# Patient Record
Sex: FEMALE | Race: WHITE | Employment: FULL TIME | URBAN - METROPOLITAN AREA
[De-identification: names, ages, dates, MRNs, and addresses within clinical notes are randomized per-mention and may not be internally consistent; named-entity substitution may affect disease eponyms.]

---

## 2019-05-17 ENCOUNTER — HOSPITAL ENCOUNTER (OUTPATIENT)
Age: 24
Discharge: HOME | End: 2019-05-19
Payer: COMMERCIAL

## 2019-05-17 ENCOUNTER — OFFICE VISIT (OUTPATIENT)
Dept: FAMILY MEDICINE CLINIC | Age: 24
End: 2019-05-17
Payer: COMMERCIAL

## 2019-05-17 VITALS
DIASTOLIC BLOOD PRESSURE: 72 MMHG | WEIGHT: 209.2 LBS | OXYGEN SATURATION: 97 % | BODY MASS INDEX: 32.83 KG/M2 | SYSTOLIC BLOOD PRESSURE: 120 MMHG | HEART RATE: 87 BPM | TEMPERATURE: 97.5 F | HEIGHT: 67 IN

## 2019-05-17 DIAGNOSIS — R31.9 URINARY TRACT INFECTION WITH HEMATURIA, SITE UNSPECIFIED: ICD-10-CM

## 2019-05-17 DIAGNOSIS — R30.0 DYSURIA: Primary | ICD-10-CM

## 2019-05-17 DIAGNOSIS — N39.0 URINARY TRACT INFECTION WITH HEMATURIA, SITE UNSPECIFIED: ICD-10-CM

## 2019-05-17 DIAGNOSIS — R30.0 DYSURIA: ICD-10-CM

## 2019-05-17 LAB
BILIRUBIN, POC: ABNORMAL
BLOOD URINE, POC: ABNORMAL
CLARITY, POC: ABNORMAL
COLOR, POC: ABNORMAL
CONTROL: NORMAL
GLUCOSE URINE, POC: ABNORMAL
KETONES, POC: ABNORMAL
LEUKOCYTE EST, POC: ABNORMAL
NITRITE, POC: ABNORMAL
PH, POC: 7.5
PREGNANCY TEST URINE, POC: NEGATIVE
PROTEIN, POC: ABNORMAL
SPECIFIC GRAVITY, POC: >=1.03
UROBILINOGEN, POC: 0.2

## 2019-05-17 PROCEDURE — 87186 SC STD MICRODIL/AGAR DIL: CPT

## 2019-05-17 PROCEDURE — 81002 URINALYSIS NONAUTO W/O SCOPE: CPT | Performed by: PHYSICIAN ASSISTANT

## 2019-05-17 PROCEDURE — 87088 URINE BACTERIA CULTURE: CPT

## 2019-05-17 PROCEDURE — 81025 URINE PREGNANCY TEST: CPT | Performed by: PHYSICIAN ASSISTANT

## 2019-05-17 PROCEDURE — 99214 OFFICE O/P EST MOD 30 MIN: CPT | Performed by: PHYSICIAN ASSISTANT

## 2019-05-17 PROCEDURE — 87077 CULTURE AEROBIC IDENTIFY: CPT

## 2019-05-17 RX ORDER — CEPHALEXIN 500 MG/1
500 CAPSULE ORAL 2 TIMES DAILY
Qty: 14 CAPSULE | Refills: 0 | Status: SHIPPED | OUTPATIENT
Start: 2019-05-17 | End: 2019-05-24

## 2019-05-17 NOTE — PROGRESS NOTES
19  Lexis Floyd : 1995 Sex: female  Age 21 y.o. Subjective:  Chief Complaint   Patient presents with    Urinary Tract Infection     onset wednesday, burning, urgency, frequency, afebrile         HPI:   Lexis Floyd , 21 y.o. female presents to express care for evaluation of possible urinary tract infection. The patient started on Wednesday with some urinary frequency urgency and burning with urination. The patient is not having back pain or flank pain. She has not noted any fevers. The patient is not having any abdominal pain. No vaginal bleeding or vaginal discharge. Patient denies chance of pregnancy. Denies chances STI. The patient is not currently on any antibiotics. She states that she has never had a urinary tract infection. ROS:   Unless otherwise stated in this report the patient's positive and negative responses for review of systems for constitutional, eyes, ENT, cardiovascular, respiratory, gastrointestinal, neurological, , musculoskeletal, and integument systems and related systems to the presenting problem are either stated in the history of present illness or were not pertinent or were negative for the symptoms and/or complaints related to the presenting medical problem. Positives and pertinent negatives as per HPI. All others reviewed and are negative. PMH:   History reviewed. No pertinent past medical history. No past surgical history on file. Medications:     Current Outpatient Medications:     cephALEXin (KEFLEX) 500 MG capsule, Take 1 capsule by mouth 2 times daily for 7 days, Disp: 14 capsule, Rfl: 0    Allergies: Allergies   Allergen Reactions    Tuberculin        Social History:     Personal Habits: Never smoked cigarettes. Does not use alcohol. . (Exercise Type/Frequency  Current)  Reviewed, no changes.   Patient is   Lives at home  Works as RN    Physical Exam:     Vitals:    19 1204   BP: 120/72   Site: Right Upper Arm Position: Sitting   Pulse: 87   Temp: 97.5 °F (36.4 °C)   TempSrc: Temporal   SpO2: 97%   Weight: 209 lb 3.2 oz (94.9 kg)   Height: 5' 7\" (1.702 m)       Exam:  Physical Exam  Nurses note and vital signs reviewed and patient is not hypoxic. General: The patient appears well and in no apparent distress. Patient is resting comfortably on cart. Skin: Warm, dry, no pallor noted. There is no rash noted. Head: Normocephalic, atraumatic. Eye: Normal conjunctiva  Ears, Nose, Mouth, and Throat: Oral mucosa is moist  Cardiovascular: Regular Rate and Rhythm  Respiratory: Patient is in no distress, no accessory muscle use, lungs are clear to auscultation, no wheezing, crackles or rhonchi  Back: Non-tender, no CVA tenderness bilaterally to percussion. GI: Normal bowel sounds, no tenderness to palpation, no masses appreciated. No rebound, guarding, or rigidity noted. Musculoskeletal: The patient has no evidence of calf tenderness, no pitting edema, symmetrical pulses noted bilaterally  Neurological: A&O x4, normal speech      Testing:     Results for orders placed or performed in visit on 05/17/19   POCT Urinalysis no Micro   Result Value Ref Range    Color, UA claudia     Clarity, UA cloudy     Glucose, UA POC neg     Bilirubin, UA neg     Ketones, UA neg     Spec Grav, UA >=1.030     Blood, UA POC moderate     pH, UA 7.5     Protein, UA POC trace     Urobilinogen, UA 0.2     Leukocytes, UA moderate     Nitrite, UA neg    POCT urine pregnancy   Result Value Ref Range    Preg Test, Ur negative     Control             Medical Decision Making:     The patient is noted to be afebrile and nontoxic appearing. The patient had UA that did show evidence of a UTI. We did obtain a urine culture, which was sent to the lab for further evaluation. Pregnancy negative. She will be started on cephalexin. The patient is to follow up with PCP for results and we will make any necessary adjustments to the patient's medication regimen. The patient will go directly to ED if notes nausea, vomiting, back pain, fever, chills or worsening symptoms. The patient has no other concerns at this time. Clinical Impression:   Debra James was seen today for urinary tract infection. Diagnoses and all orders for this visit:    Dysuria  -     POCT Urinalysis no Micro  -     POCT urine pregnancy  -     Urine Culture; Future    Urinary tract infection with hematuria, site unspecified    Other orders  -     cephALEXin (KEFLEX) 500 MG capsule; Take 1 capsule by mouth 2 times daily for 7 days        The patient is to call for any concerns or return if any of the signs or symptoms worsen. The patient is to follow-up with PCP in the next 2-3 days for repeat evaluation repeat assessment or go directly to the emergency department.      SIGNATURE: Shruthi Macias III, PA-C

## 2019-05-20 LAB
ORGANISM: ABNORMAL
URINE CULTURE, ROUTINE: ABNORMAL
URINE CULTURE, ROUTINE: ABNORMAL

## 2019-11-25 RX ORDER — CYCLOBENZAPRINE HCL 10 MG
10 TABLET ORAL NIGHTLY PRN
COMMUNITY
End: 2020-06-18 | Stop reason: ALTCHOICE

## 2020-04-20 ENCOUNTER — TELEPHONE (OUTPATIENT)
Dept: PRIMARY CARE CLINIC | Age: 25
End: 2020-04-20

## 2020-05-01 ENCOUNTER — OFFICE VISIT (OUTPATIENT)
Dept: PRIMARY CARE CLINIC | Age: 25
End: 2020-05-01
Payer: COMMERCIAL

## 2020-05-01 ENCOUNTER — HOSPITAL ENCOUNTER (OUTPATIENT)
Age: 25
Discharge: HOME | End: 2020-05-03
Payer: COMMERCIAL

## 2020-05-01 VITALS
WEIGHT: 209 LBS | OXYGEN SATURATION: 98 % | HEART RATE: 88 BPM | HEIGHT: 67 IN | BODY MASS INDEX: 32.8 KG/M2 | DIASTOLIC BLOOD PRESSURE: 74 MMHG | TEMPERATURE: 97.5 F | SYSTOLIC BLOOD PRESSURE: 116 MMHG

## 2020-05-01 LAB — SARS-COV-2, NAAT: NOT DETECTED

## 2020-05-01 PROCEDURE — G8427 DOCREV CUR MEDS BY ELIG CLIN: HCPCS | Performed by: FAMILY MEDICINE

## 2020-05-01 PROCEDURE — U0002 COVID-19 LAB TEST NON-CDC: HCPCS

## 2020-05-01 PROCEDURE — 99214 OFFICE O/P EST MOD 30 MIN: CPT | Performed by: FAMILY MEDICINE

## 2020-05-01 PROCEDURE — 1036F TOBACCO NON-USER: CPT | Performed by: FAMILY MEDICINE

## 2020-05-01 PROCEDURE — G8417 CALC BMI ABV UP PARAM F/U: HCPCS | Performed by: FAMILY MEDICINE

## 2020-05-04 ENCOUNTER — HOSPITAL ENCOUNTER (OUTPATIENT)
Age: 25
Discharge: HOME | End: 2020-05-06
Payer: COMMERCIAL

## 2020-05-04 ENCOUNTER — NURSE ONLY (OUTPATIENT)
Dept: PRIMARY CARE CLINIC | Age: 25
End: 2020-05-04

## 2020-05-04 LAB — SARS-COV-2, NAAT: NOT DETECTED

## 2020-05-04 PROCEDURE — U0002 COVID-19 LAB TEST NON-CDC: HCPCS

## 2020-06-18 ENCOUNTER — OFFICE VISIT (OUTPATIENT)
Dept: FAMILY MEDICINE CLINIC | Age: 25
End: 2020-06-18
Payer: COMMERCIAL

## 2020-06-18 VITALS
DIASTOLIC BLOOD PRESSURE: 82 MMHG | SYSTOLIC BLOOD PRESSURE: 132 MMHG | BODY MASS INDEX: 33.9 KG/M2 | RESPIRATION RATE: 18 BRPM | TEMPERATURE: 97.3 F | HEART RATE: 77 BPM | WEIGHT: 216 LBS | HEIGHT: 67 IN | OXYGEN SATURATION: 99 %

## 2020-06-18 PROCEDURE — 1036F TOBACCO NON-USER: CPT | Performed by: PHYSICIAN ASSISTANT

## 2020-06-18 PROCEDURE — G8427 DOCREV CUR MEDS BY ELIG CLIN: HCPCS | Performed by: PHYSICIAN ASSISTANT

## 2020-06-18 PROCEDURE — 99214 OFFICE O/P EST MOD 30 MIN: CPT | Performed by: PHYSICIAN ASSISTANT

## 2020-06-18 PROCEDURE — G8417 CALC BMI ABV UP PARAM F/U: HCPCS | Performed by: PHYSICIAN ASSISTANT

## 2020-06-18 RX ORDER — TIZANIDINE 4 MG/1
4 TABLET ORAL NIGHTLY PRN
Qty: 20 TABLET | Refills: 0 | Status: SHIPPED
Start: 2020-06-18 | End: 2021-07-19

## 2020-06-18 SDOH — HEALTH STABILITY: MENTAL HEALTH: HOW OFTEN DO YOU HAVE A DRINK CONTAINING ALCOHOL?: 2-4 TIMES A MONTH

## 2020-06-18 NOTE — PROGRESS NOTES
20  Berta Reeves : 1995 Sex: female  Age 25 y.o. Subjective:  Chief Complaint   Patient presents with    Neck Pain     pt states neck pain for past six months          HPI:   Berta Reeves , 25 y.o. female presents to express care for evaluation of neck pain and upper back pain. The patient has had this pain ongoing for the last several months. She was worried that she was developing some kyphosis due to her posture, weight, and her occupation. 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 The patient has not had any traumatic injury. No numbness or tingling. The patient's not having any headaches. No visual disturbances. The patient has not noted any fevers or photophobia. ROS:   Unless otherwise stated in this report the patient's positive and negative responses for review of systems for constitutional, eyes, ENT, cardiovascular, respiratory, gastrointestinal, neurological, , musculoskeletal, and integument systems and related systems to the presenting problem are either stated in the history of present illness or were not pertinent or were negative for the symptoms and/or complaints related to the presenting medical problem. Positives and pertinent negatives as per HPI. All others reviewed and are negative. PMH:   History reviewed. No pertinent past medical history. History reviewed. No pertinent surgical history. Family History   Problem Relation Age of Onset    Obesity Mother     Other Mother         GERD    Depression Father        Medications:     Current Outpatient Medications:     tiZANidine (ZANAFLEX) 4 MG tablet, Take 1 tablet by mouth nightly as needed (neck pain/spasm), Disp: 20 tablet, Rfl: 0    Allergies: Allergies   Allergen Reactions    Tuberculin        Social History:     Social History     Tobacco Use    Smoking status: Never Smoker    Smokeless tobacco: Never Used   Substance Use Topics    Alcohol use:  Yes

## 2021-07-19 ENCOUNTER — OFFICE VISIT (OUTPATIENT)
Dept: FAMILY MEDICINE CLINIC | Age: 26
End: 2021-07-19
Payer: COMMERCIAL

## 2021-07-19 VITALS
SYSTOLIC BLOOD PRESSURE: 118 MMHG | RESPIRATION RATE: 16 BRPM | BODY MASS INDEX: 34.4 KG/M2 | HEART RATE: 91 BPM | TEMPERATURE: 96.9 F | WEIGHT: 227 LBS | DIASTOLIC BLOOD PRESSURE: 78 MMHG | OXYGEN SATURATION: 98 % | HEIGHT: 68 IN

## 2021-07-19 DIAGNOSIS — Z32.01 POSITIVE URINE PREGNANCY TEST: ICD-10-CM

## 2021-07-19 DIAGNOSIS — Z11.59 NEED FOR HEPATITIS C SCREENING TEST: ICD-10-CM

## 2021-07-19 DIAGNOSIS — Z00.00 ENCOUNTER FOR MEDICAL EXAMINATION TO ESTABLISH CARE: Primary | ICD-10-CM

## 2021-07-19 DIAGNOSIS — Z11.4 ENCOUNTER FOR SCREENING FOR HIV: ICD-10-CM

## 2021-07-19 LAB
ANION GAP SERPL CALCULATED.3IONS-SCNC: 16 MMOL/L (ref 7–16)
BASOPHILS ABSOLUTE: 0.03 E9/L (ref 0–0.2)
BASOPHILS RELATIVE PERCENT: 0.3 % (ref 0–2)
BUN BLDV-MCNC: 10 MG/DL (ref 6–20)
CALCIUM SERPL-MCNC: 9.3 MG/DL (ref 8.6–10.2)
CHLORIDE BLD-SCNC: 103 MMOL/L (ref 98–107)
CO2: 19 MMOL/L (ref 22–29)
CONTROL: PRESENT
CREAT SERPL-MCNC: 0.6 MG/DL (ref 0.5–1)
EOSINOPHILS ABSOLUTE: 0.23 E9/L (ref 0.05–0.5)
EOSINOPHILS RELATIVE PERCENT: 2.2 % (ref 0–6)
GFR AFRICAN AMERICAN: >60
GFR NON-AFRICAN AMERICAN: >60 ML/MIN/1.73
GLUCOSE BLD-MCNC: 76 MG/DL (ref 74–99)
HCT VFR BLD CALC: 40.7 % (ref 34–48)
HEMOGLOBIN: 12.4 G/DL (ref 11.5–15.5)
IMMATURE GRANULOCYTES #: 0.04 E9/L
IMMATURE GRANULOCYTES %: 0.4 % (ref 0–5)
LYMPHOCYTES ABSOLUTE: 2.36 E9/L (ref 1.5–4)
LYMPHOCYTES RELATIVE PERCENT: 22.9 % (ref 20–42)
MCH RBC QN AUTO: 27.4 PG (ref 26–35)
MCHC RBC AUTO-ENTMCNC: 30.5 % (ref 32–34.5)
MCV RBC AUTO: 89.8 FL (ref 80–99.9)
MONOCYTES ABSOLUTE: 1.05 E9/L (ref 0.1–0.95)
MONOCYTES RELATIVE PERCENT: 10.2 % (ref 2–12)
NEUTROPHILS ABSOLUTE: 6.6 E9/L (ref 1.8–7.3)
NEUTROPHILS RELATIVE PERCENT: 64 % (ref 43–80)
PDW BLD-RTO: 13.3 FL (ref 11.5–15)
PLATELET # BLD: 259 E9/L (ref 130–450)
PMV BLD AUTO: 10.7 FL (ref 7–12)
POTASSIUM SERPL-SCNC: 4.2 MMOL/L (ref 3.5–5)
PREGNANCY TEST URINE, POC: POSITIVE
RBC # BLD: 4.53 E12/L (ref 3.5–5.5)
SODIUM BLD-SCNC: 138 MMOL/L (ref 132–146)
TSH SERPL DL<=0.05 MIU/L-ACNC: 12.25 UIU/ML (ref 0.27–4.2)
WBC # BLD: 10.3 E9/L (ref 4.5–11.5)

## 2021-07-19 PROCEDURE — 81025 URINE PREGNANCY TEST: CPT | Performed by: PHYSICIAN ASSISTANT

## 2021-07-19 PROCEDURE — 99204 OFFICE O/P NEW MOD 45 MIN: CPT | Performed by: PHYSICIAN ASSISTANT

## 2021-07-19 SDOH — ECONOMIC STABILITY: FOOD INSECURITY: WITHIN THE PAST 12 MONTHS, THE FOOD YOU BOUGHT JUST DIDN'T LAST AND YOU DIDN'T HAVE MONEY TO GET MORE.: PATIENT DECLINED

## 2021-07-19 SDOH — ECONOMIC STABILITY: FOOD INSECURITY: WITHIN THE PAST 12 MONTHS, YOU WORRIED THAT YOUR FOOD WOULD RUN OUT BEFORE YOU GOT MONEY TO BUY MORE.: PATIENT DECLINED

## 2021-07-19 ASSESSMENT — PATIENT HEALTH QUESTIONNAIRE - PHQ9
2. FEELING DOWN, DEPRESSED OR HOPELESS: 0
1. LITTLE INTEREST OR PLEASURE IN DOING THINGS: 0
SUM OF ALL RESPONSES TO PHQ9 QUESTIONS 1 & 2: 0
SUM OF ALL RESPONSES TO PHQ QUESTIONS 1-9: 0

## 2021-07-19 ASSESSMENT — SOCIAL DETERMINANTS OF HEALTH (SDOH): HOW HARD IS IT FOR YOU TO PAY FOR THE VERY BASICS LIKE FOOD, HOUSING, MEDICAL CARE, AND HEATING?: PATIENT DECLINED

## 2021-07-19 NOTE — PROGRESS NOTES
HPI:  The patient is a 22 y.o. female who presents today to establish care. She has no significant medical hx, and takes no daily meds. Patient would like to confirm pregnancy. Her last menses was Donita 3. She had 7 urine pregnancy tests that were positive. She has fatigue and breast tenderness. She had mild nausea that has resolved. Her first OB appointment is 08/03/2021 with Dr. Gayatri Anand. She is a nonsmoker. She is taking a prenatal vitamin. She has no acute complaints. History reviewed. No pertinent past medical history. History reviewed. No pertinent surgical history. Family History   Problem Relation Age of Onset    Obesity Mother     Other Mother         GERD    Diabetes Mother     Depression Father     No Known Problems Brother      Social History     Socioeconomic History    Marital status: Single     Spouse name: Not on file    Number of children: Not on file    Years of education: Not on file    Highest education level: Not on file   Occupational History    Not on file   Tobacco Use    Smoking status: Never Smoker    Smokeless tobacco: Never Used   Substance and Sexual Activity    Alcohol use: Not Currently    Drug use: Never    Sexual activity: Not on file   Other Topics Concern    Not on file   Social History Narrative    Not on file     Social Determinants of Health     Financial Resource Strain: Unknown    Difficulty of Paying Living Expenses: Patient refused   Food Insecurity: Unknown    Worried About Running Out of Food in the Last Year: Patient refused    [address] in the Last Year: Patient refused   Transportation Needs:     Lack of Transportation (Medical):      Lack of Transportation (Non-Medical):    Physical Activity:     Days of Exercise per Week:     Minutes of Exercise per Session:    Stress:     Feeling of Stress :    Social Connections:     Frequency of Communication with Friends and Family:     Frequency of Social Gatherings with Friends and Family:     Attends [employer] Services:     Active Member of Clubs or Organizations:     Attends Club or Organization Meetings:     Marital Status:    Intimate Partner Violence:     Fear of Current or Ex-Partner:     Emotionally Abused:     Physically Abused:     Sexually Abused: Allergies   Allergen Reactions    Tuberculin         Review of Systems:  Constitutional:  No fever, no fatigue, no chills, no headaches, no weight change  Dermatology:  No rash, no mole, no dry or sensitive skin  ENT:  No cough, no sore throat, no sinus pain, no runny nose, no ear pain  Cardiology:  No chest pain, no palpitations, no leg edema, no shortness of breath, no PND  Endocrinology:  No polydipsia, no polyuria, no cold intolerance, no heat intolerance, no polyphagia, no hair changes  Gastroenterology:  No dysphagia, no abdominal pain, no nausea, no vomiting, no constipation, no diarrhea, no heartburn  Female Reproductive:  No hot flashes, no abnormal vaginal discharge, no pain with menstruation, no pelvic pain  Musculoskeletal:  No joint pain, no leg cramps, no back pain, no muscle aches  Respiratory:  No shortness of breath, no orthopnea, no wheezing, no STARK, no hemoptysis  Urology:  No blood in the urine, no urinary frequency, no urinary incontinence, no urinary urgency, no nocturia, no dysuria  Neurology:  No numbness/tingling, no dizziness, no weakness  Psychology:  No depression, no sleep disturbances, no suicidal ideation, no anxiety    Vitals:    07/19/21 1608   BP: 118/78   Pulse: 91   Resp: 16   Temp: 96.9 °F (36.1 °C)   SpO2: 98%   Weight: 227 lb (103 kg)   Height: 5' 8\" (1.727 m)       Physical Exam  Constitutional:       General: She is not in acute distress. Appearance: She is well-developed. She is obese. HENT:      Head: Normocephalic and atraumatic. Right Ear: External ear normal.      Left Ear: External ear normal.      Nose: Nose normal.   Eyes:      General: No scleral icterus. Conjunctiva/sclera: Conjunctivae normal.      Pupils: Pupils are equal, round, and reactive to light. Neck:      Thyroid: No thyromegaly. Cardiovascular:      Rate and Rhythm: Normal rate and regular rhythm. Heart sounds: Normal heart sounds. No murmur heard. Pulmonary:      Effort: Pulmonary effort is normal. No accessory muscle usage or respiratory distress. Breath sounds: Normal breath sounds. No wheezing. Musculoskeletal:         General: Normal range of motion. Cervical back: Normal range of motion and neck supple. Right lower leg: No edema. Left lower leg: No edema. Skin:     General: Skin is warm and dry. Findings: No rash. Neurological:      Mental Status: She is alert and oriented to person, place, and time. Deep Tendon Reflexes: Reflexes are normal and symmetric. Psychiatric:         Speech: Speech normal.         Behavior: Behavior normal.         Assessment/Plan:      Caty Bucio was seen today for new patient. Diagnoses and all orders for this visit:    Encounter for medical examination to establish care    BMI 34.0-34.9,adult  -     TSH; Future    Positive urine pregnancy test  -     POCT urine pregnancy  -     HCG, QUANTITATIVE, PREGNANCY; Future  -     HIV Screen; Future  -     HEPATITIS C ANTIBODY; Future  -     CBC Auto Differential; Future  -     BASIC METABOLIC PANEL; Future  -     TSH; Future    Encounter for screening for HIV  -     HIV Screen; Future    Need for hepatitis C screening test  -     HEPATITIS C ANTIBODY; Future      As above. Call or go to ED immediately if symptoms worsen or persist.  Return in about 6 months (around 1/19/2022). , or sooner if necessary. Possible need for tdap after 27 weeks. Educational materials and/or home exercises printed for patient's review and were included in patient instructions on his/her After Visit Summary and given to patient at the end of visit.       Counseled regarding above diagnosis, including

## 2021-07-21 LAB — GONADOTROPIN, CHORIONIC (HCG) QUANT: ABNORMAL MIU/ML

## 2021-07-22 ENCOUNTER — TELEPHONE (OUTPATIENT)
Dept: FAMILY MEDICINE CLINIC | Age: 26
End: 2021-07-22

## 2021-07-22 LAB
HEPATITIS C ANTIBODY INTERPRETATION: NORMAL
HIV-1 AND HIV-2 ANTIBODIES: NORMAL

## 2021-07-22 NOTE — TELEPHONE ENCOUNTER
----- Message from Willow Oconnor sent at 7/22/2021 10:51 AM EDT -----  Subject: Message to Provider    QUESTIONS  Information for Provider? Patient received abnormal test results on   7.21.21, she has a follow up appt on Monday 7.26.21. Wants to know if she   should start taking a prescription prior to her appt.  ---------------------------------------------------------------------------  --------------  CALL BACK INFO  What is the best way for the office to contact you? OK to leave message on   voicemail  Preferred Call Back Phone Number? [phone]  ---------------------------------------------------------------------------  --------------  SCRIPT ANSWERS  Relationship to Patient?  Self

## 2021-07-23 ENCOUNTER — NURSE ONLY (OUTPATIENT)
Dept: FAMILY MEDICINE CLINIC | Age: 26
End: 2021-07-23

## 2021-07-23 DIAGNOSIS — O26.811 PREGNANCY RELATED FATIGUE IN FIRST TRIMESTER: ICD-10-CM

## 2021-07-23 DIAGNOSIS — O26.811 PREGNANCY RELATED FATIGUE IN FIRST TRIMESTER: Primary | ICD-10-CM

## 2021-07-23 NOTE — TELEPHONE ENCOUNTER
Explained to patient that it would be discussed at the office visit.  She also sent a my chart message

## 2021-07-24 LAB
T4 FREE: 1.01 NG/DL (ref 0.93–1.7)
TSH SERPL DL<=0.05 MIU/L-ACNC: 12.88 UIU/ML (ref 0.27–4.2)

## 2021-07-26 ENCOUNTER — OFFICE VISIT (OUTPATIENT)
Dept: FAMILY MEDICINE CLINIC | Age: 26
End: 2021-07-26
Payer: COMMERCIAL

## 2021-07-26 VITALS
SYSTOLIC BLOOD PRESSURE: 108 MMHG | DIASTOLIC BLOOD PRESSURE: 60 MMHG | BODY MASS INDEX: 35.82 KG/M2 | TEMPERATURE: 96.8 F | HEIGHT: 67 IN | RESPIRATION RATE: 18 BRPM | OXYGEN SATURATION: 99 % | WEIGHT: 228.2 LBS | HEART RATE: 85 BPM

## 2021-07-26 DIAGNOSIS — E03.9 HYPOTHYROIDISM, UNSPECIFIED TYPE: ICD-10-CM

## 2021-07-26 DIAGNOSIS — Z3A.08 8 WEEKS GESTATION OF PREGNANCY: ICD-10-CM

## 2021-07-26 DIAGNOSIS — E03.9 HYPOTHYROIDISM, UNSPECIFIED TYPE: Primary | ICD-10-CM

## 2021-07-26 PROCEDURE — 99214 OFFICE O/P EST MOD 30 MIN: CPT | Performed by: PHYSICIAN ASSISTANT

## 2021-07-26 RX ORDER — LEVOTHYROXINE SODIUM 0.1 MG/1
100 TABLET ORAL DAILY
Qty: 30 TABLET | Refills: 1
Start: 2021-07-26 | End: 2021-07-26 | Stop reason: SDUPTHER

## 2021-07-26 RX ORDER — LEVOTHYROXINE SODIUM 0.1 MG/1
100 TABLET ORAL DAILY
Qty: 30 TABLET | Refills: 3 | Status: SHIPPED
Start: 2021-07-26 | End: 2021-10-16 | Stop reason: SDUPTHER

## 2021-08-13 ENCOUNTER — TELEPHONE (OUTPATIENT)
Dept: FAMILY MEDICINE CLINIC | Age: 26
End: 2021-08-13

## 2021-08-13 NOTE — TELEPHONE ENCOUNTER
----- Message from Gloria Barrett sent at 8/13/2021 12:59 PM EDT -----  Subject: Appointment Request    Reason for Call: Routine Existing Condition Follow Up    QUESTIONS  Type of Appointment? Established Patient  Reason for appointment request? Available appointments did not meet   patient need  Additional Information for Provider? screen green / pt is trying to   reschedule her appointment from 8/27/21. Pt is trying to schedule for   medicine refill. SYNTHROID is the medication and to also schedule for lab   work. in person visit but pt is also open to e visit as well   ---------------------------------------------------------------------------  --------------  CALL BACK INFO  What is the best way for the office to contact you? OK to leave message on   voicemail  Preferred Call Back Phone Number? [phone]  ---------------------------------------------------------------------------  --------------  SCRIPT ANSWERS  Relationship to Patient? Self  (Is the patient requesting to be seen urgently for their symptoms?)? No  Is this follow up request related to routine Diabetes Management? No  Are you having any new concerns about your existing condition? No  Have you been diagnosed with, awaiting test results for, or told that you   are suspected of having COVID-19 (Coronavirus)? (If patient has tested   negative or was tested as a requirement for work, school, or travel and   not based on symptoms, answer no)? No  Do you currently have flu-like symptoms including fever or chills, cough,   shortness of breath, difficulty breathing, or new loss of taste or smell? No  Have you had close contact with someone with COVID-19 in the last 14 days? No  (Service Expert  click yes below to proceed with [employer] As Usual   Scheduling)?  Yes

## 2021-08-17 ENCOUNTER — TELEPHONE (OUTPATIENT)
Dept: FAMILY MEDICINE CLINIC | Age: 26
End: 2021-08-17

## 2021-08-17 DIAGNOSIS — Z11.1 TUBERCULOSIS SCREENING: Primary | ICD-10-CM

## 2021-08-17 NOTE — TELEPHONE ENCOUNTER
----- Message from Madhuri Malik sent at 8/17/2021  2:18 PM EDT -----  Subject: Message to Provider    QUESTIONS  Information for Provider? Lynsey Del Rio is 10 weeks pregnant and is in school. She is required to get a chest x-ray to prove she does not have   tuberculosis. Lynsey Del Rio is also allergic to the tuberculin bubble. Please   reach out to Lynsey Del Rio with the information requested. ---------------------------------------------------------------------------  --------------  Hernan HERRMANN  What is the best way for the office to contact you? OK to leave message on   voicemail  Preferred Call Back Phone Number? [phone]  ---------------------------------------------------------------------------  --------------  SCRIPT ANSWERS  Relationship to Patient?  Self

## 2021-08-17 NOTE — TELEPHONE ENCOUNTER
Bc she is pregnant, she would probably need a t spot, blood work, instead of the cxr.  She would have to do that at the lab before 12 bc it has to be a stat

## 2021-08-17 NOTE — TELEPHONE ENCOUNTER
They can do the labs there and send to me if needed. It is early to do the labs. It hasnt been a month of meds.  Usually we wait 4-6 weeks

## 2021-08-17 NOTE — TELEPHONE ENCOUNTER
----- Message from Leland Khan sent at 8/17/2021  1:02 PM EDT -----  Subject: Message to Provider    QUESTIONS  Information for Provider? Patient called to see if Dr. Aleisah Vasquez want change   her visit on 8/27/2021 at 9:00 AM to virtual visit . Since she currently   pregnant and has visit with OBGYN tomorrow (8/18/2021 ) where she will get   blood work down . She stated if okay with Dr Lizzy Vasquez you can blood work fax   to office. ---------------------------------------------------------------------------  --------------  Esperanza HERRMANN  What is the best way for the office to contact you? OK to leave message on   voicemail  Preferred Call Back Phone Number? [phone]  ---------------------------------------------------------------------------  --------------  SCRIPT ANSWERS  Relationship to Patient?  Self

## 2021-08-17 NOTE — TELEPHONE ENCOUNTER
----- Message from [employer] sent at 8/17/2021  1:02 PM EDT -----  Subject: Message to Provider    QUESTIONS  Information for Provider? Patient called to see if Dr. Cassy Palacios want change   her visit on 8/27/2021 at 9:00 AM to virtual visit . Since she currently   pregnant and has visit with OBGYN tomorrow (8/18/2021 ) where she will get   blood work down . She stated if okay with Dr Monica Palacios you can blood work fax   to office. ---------------------------------------------------------------------------  --------------  Rebecca HERRMANN  What is the best way for the office to contact you? OK to leave message on   voicemail  Preferred Call Back Phone Number? [phone]  ---------------------------------------------------------------------------  --------------  SCRIPT ANSWERS  Relationship to Patient?  Self

## 2021-08-18 LAB
RPR TITER: NORMAL
RPR: NON REACTIVE
T3 TOTAL: 172

## 2021-08-19 ENCOUNTER — HOSPITAL ENCOUNTER (OUTPATIENT)
Age: 26
Discharge: HOME | End: 2021-08-19
Payer: COMMERCIAL

## 2021-08-19 DIAGNOSIS — Z11.1 TUBERCULOSIS SCREENING: ICD-10-CM

## 2021-08-19 PROCEDURE — 86481 TB AG RESPONSE T-CELL SUSP: CPT

## 2021-08-19 PROCEDURE — 36415 COLL VENOUS BLD VENIPUNCTURE: CPT

## 2021-08-24 LAB
COMMENT: NORMAL
REPORT: NORMAL

## 2021-11-18 DIAGNOSIS — E03.9 HYPOTHYROIDISM, UNSPECIFIED TYPE: ICD-10-CM

## 2021-11-18 RX ORDER — LEVOTHYROXINE SODIUM 0.1 MG/1
100 TABLET ORAL DAILY
Qty: 30 TABLET | Refills: 0 | Status: SHIPPED
Start: 2021-11-18 | End: 2021-12-17 | Stop reason: SDUPTHER

## 2021-12-16 LAB — T4 TOTAL: 1.05

## 2021-12-17 DIAGNOSIS — E03.9 HYPOTHYROIDISM, UNSPECIFIED TYPE: ICD-10-CM

## 2021-12-17 LAB
BASOPHILS ABSOLUTE: NORMAL
BASOPHILS RELATIVE PERCENT: NORMAL
EOSINOPHILS ABSOLUTE: NORMAL
EOSINOPHILS RELATIVE PERCENT: NORMAL
HCT VFR BLD CALC: 37.2 % (ref 36–46)
HEMOGLOBIN: 12.3 G/DL (ref 12–16)
LYMPHOCYTES ABSOLUTE: NORMAL
LYMPHOCYTES RELATIVE PERCENT: NORMAL
MCH RBC QN AUTO: NORMAL PG
MCHC RBC AUTO-ENTMCNC: NORMAL G/DL
MCV RBC AUTO: NORMAL FL
MONOCYTES ABSOLUTE: NORMAL
MONOCYTES RELATIVE PERCENT: NORMAL
NEUTROPHILS ABSOLUTE: NORMAL
NEUTROPHILS RELATIVE PERCENT: NORMAL
PLATELET # BLD: 222 K/ΜL
PMV BLD AUTO: NORMAL FL
RBC # BLD: NORMAL 10*6/UL
WBC # BLD: 10.4 10^3/ML

## 2021-12-20 RX ORDER — LEVOTHYROXINE SODIUM 0.1 MG/1
100 TABLET ORAL DAILY
Qty: 30 TABLET | Refills: 0 | Status: SHIPPED
Start: 2021-12-20 | End: 2022-01-13 | Stop reason: SDUPTHER

## 2021-12-21 ENCOUNTER — TELEPHONE (OUTPATIENT)
Dept: FAMILY MEDICINE CLINIC | Age: 26
End: 2021-12-21

## 2021-12-21 NOTE — TELEPHONE ENCOUNTER
Left pt a vm that an appt with labs is needed and gave her the "[employer]" phone and [employer] phone number

## 2021-12-21 NOTE — TELEPHONE ENCOUNTER
Patient called and stated she just had labs done at her OB/GYNE. I called Dr. Moffett Cover office and left a message to please fax recent labs. She does not want to get more labs done for you.

## 2022-03-10 ENCOUNTER — APPOINTMENT (OUTPATIENT)
Dept: LABOR AND DELIVERY | Age: 27
End: 2022-03-10
Payer: COMMERCIAL

## 2022-03-10 ENCOUNTER — HOSPITAL ENCOUNTER (INPATIENT)
Age: 27
LOS: 4 days | Discharge: HOME | End: 2022-03-14
Attending: OBSTETRICS & GYNECOLOGY | Admitting: OBSTETRICS & GYNECOLOGY
Payer: COMMERCIAL

## 2022-03-10 PROBLEM — Z3A.39 39 WEEKS GESTATION OF PREGNANCY: Status: ACTIVE | Noted: 2022-03-10

## 2022-03-10 LAB
AMPHETAMINE SCREEN, URINE: NOT DETECTED
BARBITURATE SCREEN URINE: NOT DETECTED
BENZODIAZEPINE SCREEN, URINE: NOT DETECTED
CANNABINOID SCREEN URINE: NOT DETECTED
COCAINE METABOLITE SCREEN URINE: NOT DETECTED
FENTANYL SCREEN, URINE: NOT DETECTED
HCT VFR BLD CALC: 40.2 % (ref 34–48)
HEMOGLOBIN: 13.3 G/DL (ref 11.5–15.5)
Lab: NORMAL
MCH RBC QN AUTO: 29.1 PG (ref 26–35)
MCHC RBC AUTO-ENTMCNC: 33.1 % (ref 32–34.5)
MCV RBC AUTO: 88 FL (ref 80–99.9)
METHADONE SCREEN, URINE: NOT DETECTED
OPIATE SCREEN URINE: NOT DETECTED
OXYCODONE URINE: NOT DETECTED
PDW BLD-RTO: 14.7 FL (ref 11.5–15)
PHENCYCLIDINE SCREEN URINE: NOT DETECTED
PLATELET # BLD: 210 E9/L (ref 130–450)
PMV BLD AUTO: 11.6 FL (ref 7–12)
RBC # BLD: 4.57 E12/L (ref 3.5–5.5)
WBC # BLD: 11 E9/L (ref 4.5–11.5)

## 2022-03-10 PROCEDURE — 80307 DRUG TEST PRSMV CHEM ANLYZR: CPT

## 2022-03-10 PROCEDURE — 86901 BLOOD TYPING SEROLOGIC RH(D): CPT

## 2022-03-10 PROCEDURE — 1220000001 HC SEMI PRIVATE L&D R&B

## 2022-03-10 PROCEDURE — 6370000000 HC RX 637 (ALT 250 FOR IP): Performed by: OBSTETRICS & GYNECOLOGY

## 2022-03-10 PROCEDURE — 36415 COLL VENOUS BLD VENIPUNCTURE: CPT

## 2022-03-10 PROCEDURE — 85027 COMPLETE CBC AUTOMATED: CPT

## 2022-03-10 PROCEDURE — 2580000003 HC RX 258: Performed by: OBSTETRICS & GYNECOLOGY

## 2022-03-10 PROCEDURE — 86900 BLOOD TYPING SEROLOGIC ABO: CPT

## 2022-03-10 PROCEDURE — 86850 RBC ANTIBODY SCREEN: CPT

## 2022-03-10 RX ORDER — ONDANSETRON 2 MG/ML
4 INJECTION INTRAMUSCULAR; INTRAVENOUS EVERY 6 HOURS PRN
Status: DISCONTINUED | OUTPATIENT
Start: 2022-03-10 | End: 2022-03-14 | Stop reason: HOSPADM

## 2022-03-10 RX ORDER — SODIUM CHLORIDE, SODIUM LACTATE, POTASSIUM CHLORIDE, AND CALCIUM CHLORIDE .6; .31; .03; .02 G/100ML; G/100ML; G/100ML; G/100ML
500 INJECTION, SOLUTION INTRAVENOUS PRN
Status: DISCONTINUED | OUTPATIENT
Start: 2022-03-10 | End: 2022-03-14 | Stop reason: HOSPADM

## 2022-03-10 RX ORDER — SODIUM CHLORIDE, SODIUM LACTATE, POTASSIUM CHLORIDE, AND CALCIUM CHLORIDE .6; .31; .03; .02 G/100ML; G/100ML; G/100ML; G/100ML
1000 INJECTION, SOLUTION INTRAVENOUS PRN
Status: DISCONTINUED | OUTPATIENT
Start: 2022-03-10 | End: 2022-03-14 | Stop reason: HOSPADM

## 2022-03-10 RX ORDER — PENICILLIN G POTASSIUM 5000000 [IU]/1
INJECTION, POWDER, FOR SOLUTION INTRAMUSCULAR; INTRAVENOUS
Status: DISPENSED
Start: 2022-03-10 | End: 2022-03-11

## 2022-03-10 RX ORDER — SODIUM CHLORIDE, SODIUM LACTATE, POTASSIUM CHLORIDE, CALCIUM CHLORIDE 600; 310; 30; 20 MG/100ML; MG/100ML; MG/100ML; MG/100ML
INJECTION, SOLUTION INTRAVENOUS CONTINUOUS
Status: DISCONTINUED | OUTPATIENT
Start: 2022-03-10 | End: 2022-03-12 | Stop reason: SDUPTHER

## 2022-03-10 RX ADMIN — SODIUM CHLORIDE, POTASSIUM CHLORIDE, SODIUM LACTATE AND CALCIUM CHLORIDE: 600; 310; 30; 20 INJECTION, SOLUTION INTRAVENOUS at 23:48

## 2022-03-10 RX ADMIN — Medication 25 MCG: at 23:48

## 2022-03-11 ENCOUNTER — ANESTHESIA (OUTPATIENT)
Dept: LABOR AND DELIVERY | Age: 27
End: 2022-03-11
Payer: COMMERCIAL

## 2022-03-11 ENCOUNTER — ANESTHESIA EVENT (OUTPATIENT)
Dept: LABOR AND DELIVERY | Age: 27
End: 2022-03-11
Payer: COMMERCIAL

## 2022-03-11 LAB
ABO/RH: NORMAL
ANTIBODY SCREEN: NORMAL

## 2022-03-11 PROCEDURE — 2580000003 HC RX 258: Performed by: OBSTETRICS & GYNECOLOGY

## 2022-03-11 PROCEDURE — 6370000000 HC RX 637 (ALT 250 FOR IP): Performed by: OBSTETRICS & GYNECOLOGY

## 2022-03-11 PROCEDURE — 1220000001 HC SEMI PRIVATE L&D R&B

## 2022-03-11 PROCEDURE — 6360000002 HC RX W HCPCS: Performed by: OBSTETRICS & GYNECOLOGY

## 2022-03-11 RX ADMIN — Medication 25 MCG: at 08:17

## 2022-03-11 RX ADMIN — Medication 25 MCG: at 12:46

## 2022-03-11 RX ADMIN — Medication 25 MCG: at 18:49

## 2022-03-11 RX ADMIN — Medication 25 MCG: at 03:54

## 2022-03-11 RX ADMIN — BUTORPHANOL TARTRATE 2 MG: 2 INJECTION, SOLUTION INTRAMUSCULAR; INTRAVENOUS at 21:40

## 2022-03-11 RX ADMIN — Medication 25 MCG: at 22:56

## 2022-03-11 RX ADMIN — SODIUM CHLORIDE, POTASSIUM CHLORIDE, SODIUM LACTATE AND CALCIUM CHLORIDE: 600; 310; 30; 20 INJECTION, SOLUTION INTRAVENOUS at 18:16

## 2022-03-11 ASSESSMENT — PAIN SCALES - GENERAL
PAINLEVEL_OUTOF10: 4
PAINLEVEL_OUTOF10: 7

## 2022-03-11 NOTE — ANESTHESIA PRE PROCEDURE
Department of Anesthesiology  Preprocedure Note       Name:  Heather Lutz   Age:  32 y.o.  :  1995                                          MRN:  [id number]         Date:  3/11/2022      Surgeon: * No surgeons listed *    Procedure: * No procedures listed *    Medications prior to admission:   Prior to Admission medications    Medication Sig Start Date End Date Taking? Authorizing Provider   levothyroxine (SYNTHROID) 100 MCG tablet Take 1 tablet by mouth Daily 22  Yes Ina Mccoy PA-C   Prenatal Multivit-Min-Fe-FA (PRENATAL 1 + IRON PO) Take by mouth   Yes Historical Provider, MD       Current medications:    Current Facility-Administered Medications   Medication Dose Route Frequency Provider Last Rate Last Admin    lactated ringers infusion   IntraVENous Continuous Ceferino Dickinson  mL/hr at 03/10/22 2348 New Bag at 03/10/22 2348    lactated ringers bolus  500 mL IntraVENous PRN Ceferino Dickinson DO        Or    lactated ringers bolus  1,000 mL IntraVENous PRN Ceferino Dickinson, DO        oxytocin (PITOCIN) 30 units in 500 mL infusion  87.3 arnulfo-units/min IntraVENous Continuous PRN Ceferino Dickinson DO        And    oxytocin (PITOCIN) 10 unit bolus from the bag  10 Units IntraVENous PRN Ceferino Dickinson, DO        ondansetron [facility] injection 4 mg  4 mg IntraVENous Q6H PRN Ceferino Dickinson DO        miSOPROStol (CYTOTEC) pre-split tablet TABS 25 mcg  25 mcg Vaginal Q4H Ceferino Dickinson, DO   25 mcg at 22 0354    butorphanol (STADOL) injection 2 mg  2 mg IntraVENous Q3H PRN Ceferino Dickinson DO        penicillin G potassium [id number] units injection                Allergies: Allergies   Allergen Reactions    Tuberculin        Problem List:    Patient Active Problem List   Diagnosis Code    Hypothyroidism E03.9    39 weeks gestation of pregnancy Z3A.39       Past Medical History:  History reviewed. No pertinent past medical history. Past Surgical History:  History reviewed.  No pertinent surgical history. Social History:    Social History     Tobacco Use    Smoking status: Never Smoker    Smokeless tobacco: Never Used   Substance Use Topics    Alcohol use: Not Currently                                Counseling given: Not Answered      Vital Signs (Current):   Vitals:    03/10/22 2304 03/10/22 2330 03/11/22 0354   BP: 124/67  (!) 114/58   Pulse: 98  88   Resp: 16  12   Temp: 36.8 °C (98.2 °F)  36.4 °C (97.5 °F)   TempSrc: Oral  Oral   SpO2: 98%     Weight:  228 lb (103.4 kg)    Height:  5' 7\" (1.702 m)                                               BP Readings from Last 3 Encounters:   03/11/22 (!) 114/58   07/26/21 108/60   07/19/21 118/78       NPO Status:                                                                                 BMI:   Wt Readings from Last 3 Encounters:   03/10/22 228 lb (103.4 kg)   07/26/21 228 lb 3.2 oz (103.5 kg)   07/19/21 227 lb (103 kg)     Body mass index is 35.71 kg/m². CBC:   Lab Results   Component Value Date    WBC 11.0 03/10/2022    RBC 4.57 03/10/2022    HGB 13.3 03/10/2022    HCT 40.2 03/10/2022    MCV 88.0 03/10/2022    RDW 14.7 03/10/2022     03/10/2022       CMP:   Lab Results   Component Value Date     07/19/2021    K 4.2 07/19/2021     07/19/2021    CO2 19 07/19/2021    BUN 10 07/19/2021    CREATININE 0.6 07/19/2021    GFRAA >60 07/19/2021    LABGLOM >60 07/19/2021    GLUCOSE 76 07/19/2021    CALCIUM 9.3 07/19/2021       POC Tests: No results for input(s): POCGLU, POCNA, POCK, POCCL, POCBUN, POCHEMO, POCHCT in the last 72 hours.     Coags: No results found for: PROTIME, INR, APTT    HCG (If Applicable):   Lab Results   Component Value Date    PREGTESTUR positive 07/19/2021        ABGs: No results found for: PHART, PO2ART, IOL4OXN, IGK5LCR, BEART, N4AJIEAV     Type & Screen (If Applicable):  No results found for: LABABO, LABRH    Drug/Infectious Status (If Applicable):  No results found for: HIV, HEPCAB    COVID-19 Screening (If Applicable):   Lab Results   Component Value Date    COVID19 Not Detected 05/04/2020           Anesthesia Evaluation  Patient summary reviewed no history of anesthetic complications (never had anestesia/ deies family x): Airway: Mallampati: III  TM distance: >3 FB   Neck ROM: full  Comment: Juan David Pooja noted on left advised to removed  Mouth opening: > = 3 FB Dental:          Pulmonary:Negative Pulmonary ROS breath sounds clear to auscultation                            ROS comment: covid in jan 2022  covid negive at 39 eeks   Cardiovascular:Negative CV ROS            Rhythm: regular  Rate: normal                    Neuro/Psych:   Negative Neuro/Psych ROS              GI/Hepatic/Renal:   (+) GERD (tums):,           Endo/Other:    (+) hypothyroidism::., .    Diabetes: on synthrioid last dose 3/10/22. Abdominal:             Vascular: Other Findings:             Anesthesia Plan      general, spinal and epidural     ASA 2     (Risks and benefits disused agree to proceed with epidural)        Anesthetic plan and risks discussed with patient. Use of blood products discussed with patient whom consented to blood products. Plan discussed with attending.                   KENDELL Philippe - CRNA   3/11/2022

## 2022-03-11 NOTE — PLAN OF CARE
Problem: Anxiety:  Goal: Level of anxiety will decrease  Description: Level of anxiety will decrease  Outcome: Ongoing     Problem: Breathing Pattern - Ineffective:  Goal: Able to breathe comfortably  Description: Able to breathe comfortably  Outcome: Ongoing     Problem: Fluid Volume - Imbalance:  Goal: Absence of imbalanced fluid volume signs and symptoms  Description: Absence of imbalanced fluid volume signs and symptoms  Outcome: Ongoing  Goal: Absence of intrapartum hemorrhage signs and symptoms  Description: Absence of intrapartum hemorrhage signs and symptoms  Outcome: Ongoing     Problem: Infection - Intrapartum Infection:  Goal: Will show no infection signs and symptoms  Description: Will show no infection signs and symptoms  Outcome: Ongoing     Problem: Labor Process - Prolonged:  Goal: Labor progression, first stage, within specified pattern  Description: Labor progression, first stage, within specified pattern  Outcome: Ongoing  Goal: Labor progession, second stage, within specified pattern  Description: Labor progession, second stage, within specified pattern  Outcome: Ongoing  Goal: Uterine contractions within specified parameters  Description: Uterine contractions within specified parameters  Outcome: Ongoing     Problem:  Screening:  Goal: Ability to make informed decisions regarding treatment has improved  Description: Ability to make informed decisions regarding treatment has improved  Outcome: Ongoing     Problem: Pain - Acute:  Goal: Pain level will decrease  Description: Pain level will decrease  Outcome: Ongoing  Goal: Able to cope with pain  Description: Able to cope with pain  Outcome: Ongoing     Problem: Tissue Perfusion - Uteroplacental, Altered:  Goal: Absence of abnormal fetal heart rate pattern  Description: Absence of abnormal fetal heart rate pattern  Outcome: Ongoing     Problem: Urinary Retention:  Goal: Experiences of bladder distention will decrease  Description: Experiences of bladder distention will decrease  Outcome: Ongoing  Goal: Urinary elimination within specified parameters  Description: Urinary elimination within specified parameters  Outcome: Ongoing     Problem: Falls - Risk of:  Goal: Will remain free from falls  Description: Will remain free from falls  Outcome: Ongoing  Goal: Absence of physical injury  Description: Absence of physical injury  Outcome: Ongoing     Problem: Sensory:  Goal: Pain level will decrease  Description: Pain level will decrease  Outcome: Ongoing  Goal: Ability to identify pain intensity on a pain scale and rate it consistently will improve  Description: Ability to identify pain intensity on a pain scale and rate it consistently will improve  Outcome: Ongoing  Goal: Ability to maintain vital signs within normal range will improve  Description: Ability to maintain vital signs within normal range will improve  Outcome: Ongoing     Problem: Infant Care:  Goal: Avoidance of environmental tobacco smoke  Description: Avoidance of environmental tobacco smoke  Outcome: Ongoing     Problem: Breastfeeding - Ineffective:  Goal: Infant able to latch onto breast  Description: Infant able to latch onto breast  Outcome: Ongoing  Goal: Intact skin on mother's nipple  Description: Intact skin on mother's nipple  Outcome: Ongoing  Goal: Uninterrupted skin-to-skin contact during initial breast-feeding if medically possible  Description: Uninterrupted skin-to-skin contact during initial breast-feeding if medically possible  Outcome: Ongoing  Goal: Urine and stool output as expected for age  Description: Urine and stool output as expected for age  Outcome: Ongoing  Goal: Weight loss within specified parameters for age  Description: Weight loss within specified parameters for age  Outcome: Ongoing

## 2022-03-11 NOTE — PROGRESS NOTES
Patient is a , 40 weeks who presents to labor and delivery for a scheduled IOL. Patient denies any LOf, VB and states +FM. Patient denies any complications with this pregnancy. Patient placed on EFM and call light in reach.

## 2022-03-12 PROCEDURE — 6360000002 HC RX W HCPCS: Performed by: ANESTHESIOLOGY

## 2022-03-12 PROCEDURE — 6360000002 HC RX W HCPCS: Performed by: OBSTETRICS & GYNECOLOGY

## 2022-03-12 PROCEDURE — 51701 INSERT BLADDER CATHETER: CPT

## 2022-03-12 PROCEDURE — 3E033VJ INTRODUCTION OF OTHER HORMONE INTO PERIPHERAL VEIN, PERCUTANEOUS APPROACH: ICD-10-PCS | Performed by: OBSTETRICS & GYNECOLOGY

## 2022-03-12 PROCEDURE — 7200000001 HC VAGINAL DELIVERY

## 2022-03-12 PROCEDURE — 2500000003 HC RX 250 WO HCPCS: Performed by: ANESTHESIOLOGY

## 2022-03-12 PROCEDURE — 0KQM0ZZ REPAIR PERINEUM MUSCLE, OPEN APPROACH: ICD-10-PCS | Performed by: OBSTETRICS & GYNECOLOGY

## 2022-03-12 PROCEDURE — 2580000003 HC RX 258: Performed by: OBSTETRICS & GYNECOLOGY

## 2022-03-12 PROCEDURE — 1220000000 HC SEMI PRIVATE OB R&B

## 2022-03-12 PROCEDURE — 2500000003 HC RX 250 WO HCPCS

## 2022-03-12 PROCEDURE — 6370000000 HC RX 637 (ALT 250 FOR IP): Performed by: OBSTETRICS & GYNECOLOGY

## 2022-03-12 PROCEDURE — 3700000025 EPIDURAL BLOCK: Performed by: ANESTHESIOLOGY

## 2022-03-12 PROCEDURE — 3E0P7VZ INTRODUCTION OF HORMONE INTO FEMALE REPRODUCTIVE, VIA NATURAL OR ARTIFICIAL OPENING: ICD-10-PCS | Performed by: OBSTETRICS & GYNECOLOGY

## 2022-03-12 RX ORDER — SODIUM CHLORIDE 9 MG/ML
25 INJECTION, SOLUTION INTRAVENOUS PRN
Status: DISCONTINUED | OUTPATIENT
Start: 2022-03-12 | End: 2022-03-14 | Stop reason: HOSPADM

## 2022-03-12 RX ORDER — ACETAMINOPHEN 325 MG/1
650 TABLET ORAL EVERY 4 HOURS PRN
Status: DISCONTINUED | OUTPATIENT
Start: 2022-03-12 | End: 2022-03-14 | Stop reason: HOSPADM

## 2022-03-12 RX ORDER — HYDROCODONE BITARTRATE AND ACETAMINOPHEN 5; 325 MG/1; MG/1
2 TABLET ORAL EVERY 4 HOURS PRN
Status: DISCONTINUED | OUTPATIENT
Start: 2022-03-12 | End: 2022-03-14 | Stop reason: HOSPADM

## 2022-03-12 RX ORDER — MODIFIED LANOLIN
OINTMENT (GRAM) TOPICAL PRN
Status: DISCONTINUED | OUTPATIENT
Start: 2022-03-12 | End: 2022-03-14 | Stop reason: HOSPADM

## 2022-03-12 RX ORDER — SODIUM CHLORIDE 0.9 % (FLUSH) 0.9 %
5-40 SYRINGE (ML) INJECTION EVERY 12 HOURS SCHEDULED
Status: DISCONTINUED | OUTPATIENT
Start: 2022-03-12 | End: 2022-03-14 | Stop reason: HOSPADM

## 2022-03-12 RX ORDER — ONDANSETRON 2 MG/ML
4 INJECTION INTRAMUSCULAR; INTRAVENOUS EVERY 6 HOURS PRN
Status: DISCONTINUED | OUTPATIENT
Start: 2022-03-12 | End: 2022-03-12 | Stop reason: HOSPADM

## 2022-03-12 RX ORDER — SODIUM CHLORIDE, SODIUM LACTATE, POTASSIUM CHLORIDE, CALCIUM CHLORIDE 600; 310; 30; 20 MG/100ML; MG/100ML; MG/100ML; MG/100ML
INJECTION, SOLUTION INTRAVENOUS CONTINUOUS
Status: DISCONTINUED | OUTPATIENT
Start: 2022-03-12 | End: 2022-03-14 | Stop reason: HOSPADM

## 2022-03-12 RX ORDER — SODIUM CHLORIDE 0.9 % (FLUSH) 0.9 %
5-40 SYRINGE (ML) INJECTION PRN
Status: DISCONTINUED | OUTPATIENT
Start: 2022-03-12 | End: 2022-03-14 | Stop reason: HOSPADM

## 2022-03-12 RX ORDER — LIDOCAINE HYDROCHLORIDE 10 MG/ML
INJECTION, SOLUTION INFILTRATION; PERINEURAL
Status: COMPLETED
Start: 2022-03-12 | End: 2022-03-12

## 2022-03-12 RX ORDER — HYDROCODONE BITARTRATE AND ACETAMINOPHEN 5; 325 MG/1; MG/1
1 TABLET ORAL EVERY 4 HOURS PRN
Status: DISCONTINUED | OUTPATIENT
Start: 2022-03-12 | End: 2022-03-14 | Stop reason: HOSPADM

## 2022-03-12 RX ORDER — NALBUPHINE HCL 10 MG/ML
5 AMPUL (ML) INJECTION EVERY 4 HOURS PRN
Status: DISCONTINUED | OUTPATIENT
Start: 2022-03-12 | End: 2022-03-12 | Stop reason: HOSPADM

## 2022-03-12 RX ORDER — NALOXONE HYDROCHLORIDE 0.4 MG/ML
0.4 INJECTION, SOLUTION INTRAMUSCULAR; INTRAVENOUS; SUBCUTANEOUS PRN
Status: DISCONTINUED | OUTPATIENT
Start: 2022-03-12 | End: 2022-03-12 | Stop reason: HOSPADM

## 2022-03-12 RX ORDER — DOCUSATE SODIUM 100 MG/1
100 CAPSULE, LIQUID FILLED ORAL 2 TIMES DAILY
Status: DISCONTINUED | OUTPATIENT
Start: 2022-03-12 | End: 2022-03-14 | Stop reason: HOSPADM

## 2022-03-12 RX ORDER — PENICILLIN G 3000000 [IU]/50ML
3 INJECTION, SOLUTION INTRAVENOUS EVERY 4 HOURS
Status: DISCONTINUED | OUTPATIENT
Start: 2022-03-12 | End: 2022-03-14 | Stop reason: HOSPADM

## 2022-03-12 RX ORDER — ACETAMINOPHEN 650 MG
TABLET, EXTENDED RELEASE ORAL
Status: COMPLETED
Start: 2022-03-12 | End: 2022-03-12

## 2022-03-12 RX ORDER — IBUPROFEN 800 MG/1
800 TABLET ORAL EVERY 8 HOURS
Status: DISCONTINUED | OUTPATIENT
Start: 2022-03-12 | End: 2022-03-14 | Stop reason: HOSPADM

## 2022-03-12 RX ORDER — LEVOTHYROXINE SODIUM 0.1 MG/1
100 TABLET ORAL DAILY
Status: DISCONTINUED | OUTPATIENT
Start: 2022-03-13 | End: 2022-03-14 | Stop reason: HOSPADM

## 2022-03-12 RX ORDER — FERROUS SULFATE 325(65) MG
325 TABLET ORAL 2 TIMES DAILY WITH MEALS
Status: DISCONTINUED | OUTPATIENT
Start: 2022-03-12 | End: 2022-03-14 | Stop reason: HOSPADM

## 2022-03-12 RX ORDER — METHYLERGONOVINE MALEATE 0.2 MG/ML
200 INJECTION INTRAVENOUS ONCE
Status: COMPLETED | OUTPATIENT
Start: 2022-03-12 | End: 2022-03-12

## 2022-03-12 RX ADMIN — Medication 15 ML/HR: at 05:00

## 2022-03-12 RX ADMIN — METHYLERGONOVINE MALEATE 200 MCG: 0.2 INJECTION, SOLUTION INTRAMUSCULAR; INTRAVENOUS at 15:00

## 2022-03-12 RX ADMIN — SODIUM CHLORIDE, POTASSIUM CHLORIDE, SODIUM LACTATE AND CALCIUM CHLORIDE: 600; 310; 30; 20 INJECTION, SOLUTION INTRAVENOUS at 04:40

## 2022-03-12 RX ADMIN — LIDOCAINE HYDROCHLORIDE: 10 INJECTION, SOLUTION INFILTRATION; PERINEURAL at 14:38

## 2022-03-12 RX ADMIN — ONDANSETRON 4 MG: 2 INJECTION INTRAMUSCULAR; INTRAVENOUS at 10:42

## 2022-03-12 RX ADMIN — DOCUSATE SODIUM 100 MG: 100 CAPSULE, LIQUID FILLED ORAL at 21:45

## 2022-03-12 RX ADMIN — Medication 1 MILLI-UNITS/MIN: at 03:20

## 2022-03-12 RX ADMIN — Medication: at 14:38

## 2022-03-12 RX ADMIN — PENICILLIN G POTASSIUM 5 MILLION UNITS: 5000000 INJECTION, POWDER, FOR SOLUTION INTRAMUSCULAR; INTRAVENOUS at 03:20

## 2022-03-12 RX ADMIN — BUTORPHANOL TARTRATE 2 MG: 2 INJECTION, SOLUTION INTRAMUSCULAR; INTRAVENOUS at 15:07

## 2022-03-12 RX ADMIN — IBUPROFEN 800 MG: 800 TABLET, FILM COATED ORAL at 21:45

## 2022-03-12 RX ADMIN — PENICILLIN G 3 MILLION UNITS: 3000000 INJECTION, SOLUTION INTRAVENOUS at 09:06

## 2022-03-12 ASSESSMENT — PAIN SCALES - GENERAL
PAINLEVEL_OUTOF10: 4
PAINLEVEL_OUTOF10: 10
PAINLEVEL_OUTOF10: 10

## 2022-03-12 ASSESSMENT — PAIN DESCRIPTION - DESCRIPTORS
DESCRIPTORS: CRAMPING
DESCRIPTORS: CRAMPING

## 2022-03-12 NOTE — FLOWSHEET NOTE
Admitted and oriented to room  . Assessment is as charted. Infant safety discussed with voiced  understanding including safe sleep. [facility] Instructed to call  for needs . instructed not to leave unit while admitted to hospital. Visitation policy presented.

## 2022-03-12 NOTE — LACTATION NOTE
Mom reports baby nursed well after delivery,baby  in nursery at this time. Encouraged skin to skin and frequent attempts at breast to stimulate milk production. Instructed on normal infant behavior in the first 12-24 hours and importance of stimulating the baby frequently to eat during this time. Reviewed hand expression, and encouraged to hand express drops of colostrum when baby is sleepy. Instructed that baby may also feed 8-12 times a day- cluster feeding at times- as her milk supply is being established. Instructed on benefits of skin to skin and avoidance of pacifier / artificial nipple use until breastfeeding is well established. Educated on making sure infant has an open airway while breastfeeding and skin to skin. Instructed on hunger cues and waking techniques to try. Reviewed signs of adequate I & O; allow baby to feed ad carol and not to limit time at breast. Breastfeeding booklet provided with review of its contents. Encouraged to call with any concerns. Mom has a breast pump for home use, hand pump given also. Lactation office # and [employer] information supplied for educational needs.

## 2022-03-12 NOTE — PROGRESS NOTES
Dr Taylor Said called in, updated on SROM, pit, epidural, and tachysystole ctx pattern. No new orders.

## 2022-03-12 NOTE — PROGRESS NOTES
Updated Dr Jaida Lemus at bedside of SVE and N/V. Also updated Dr Jaida Lemus on tracing and hard to  CTXs.

## 2022-03-12 NOTE — L&D DELIVERY NOTE
A/Ox4 pt who is ambulatory but sts it hurts to walk loudly C/O Lt rib pain x4 days described as sharp especially upon palpation and movement. Pt denies CP, SOB, N/V   Vaginal Delivery Note    Details of Procedure: The patient is a 32 y.o. female at 41w4d   OB History        1    Para   1    Term   1            AB        Living   1       SAB        IAB        Ectopic        Molar        Multiple   0    Live Births   1             who was admitted for induction. She received the following interventions: ARBOW, vaginal Cytotec and IV Pitocin induction She was known to be GBS positive and did receive antibiotic prophylaxis. The patient progressed well,did receive an epidural, became complete and started to push. After pushing for 90 minutes the fetal head was at the perineum, nose and mouth suctioned with bulb suction and the rest of the infant delivered atraumatically, placed on mother abdomen. Cord was clamped and cut and infant handed off to the waiting nurse for evaluation. The delivery of the placenta was spontaneous. The perineum and vagina were explored and a second degree laceration was repaired in standard fashion.     Anesthesia:  epidural anesthesia    Estimated blood loss:  300ml    Specimen:  Placenta not sent to pathology     Cord blood sent Yes    Complications:  Shoulder dystocia <20 sec resolved w suprapubic pressure and Manuel    Condition:  infant stable to general nursery and mother stable    Zeyad Toure, DO

## 2022-03-12 NOTE — ANESTHESIA PROCEDURE NOTES
Epidural Block    Patient location during procedure: OB  Start time: 3/12/2022 4:55 AM  End time: 3/12/2022 5:00 AM  Reason for block: labor epidural  Staffing  Performed: resident/CRNA   Anesthesiologist: Glima Mckeon MD  Resident/CRNA: KENDELL Smith CRNA  Preanesthetic Checklist  Completed: patient identified, IV checked, site marked, risks and benefits discussed, surgical consent, monitors and equipment checked, pre-op evaluation, timeout performed, anesthesia consent given, oxygen available and patient being monitored  Epidural  Patient position: sitting  Prep: ChloraPrep  Patient monitoring: cardiac monitor, continuous pulse ox and frequent blood pressure checks  Approach: midline  Location: lumbar (1-5)  Injection technique: MARGOT saline  Provider prep: mask and sterile gloves  Needle  Needle type: Tuohy   Needle gauge: 18 G  Needle length: 2.5 in  Needle insertion depth: 7 cm  Catheter type: end hole  Catheter size: 20 G.   Catheter at skin depth: 13 cm  Test dose: negative  Assessment  Events: blood aspirated  Hemodynamics: stable  Attempts: 2

## 2022-03-12 NOTE — PROGRESS NOTES
Patient transferred over to mom and baby in room 307. Anu care and  Handoff given. Baby went to nursery. Both patient and baby VSS.

## 2022-03-13 LAB
HCT VFR BLD CALC: 31.7 % (ref 34–48)
HEMOGLOBIN: 10.1 G/DL (ref 11.5–15.5)

## 2022-03-13 PROCEDURE — 1220000000 HC SEMI PRIVATE OB R&B

## 2022-03-13 PROCEDURE — 85018 HEMOGLOBIN: CPT

## 2022-03-13 PROCEDURE — 36415 COLL VENOUS BLD VENIPUNCTURE: CPT

## 2022-03-13 PROCEDURE — 6370000000 HC RX 637 (ALT 250 FOR IP): Performed by: OBSTETRICS & GYNECOLOGY

## 2022-03-13 PROCEDURE — 85014 HEMATOCRIT: CPT

## 2022-03-13 RX ADMIN — Medication: at 08:41

## 2022-03-13 RX ADMIN — IBUPROFEN 800 MG: 800 TABLET, FILM COATED ORAL at 06:51

## 2022-03-13 RX ADMIN — DOCUSATE SODIUM 100 MG: 100 CAPSULE, LIQUID FILLED ORAL at 21:20

## 2022-03-13 RX ADMIN — IBUPROFEN 800 MG: 800 TABLET, FILM COATED ORAL at 17:59

## 2022-03-13 RX ADMIN — DOCUSATE SODIUM 100 MG: 100 CAPSULE, LIQUID FILLED ORAL at 08:42

## 2022-03-13 RX ADMIN — Medication: at 04:35

## 2022-03-13 RX ADMIN — LEVOTHYROXINE SODIUM 100 MCG: 100 TABLET ORAL at 06:52

## 2022-03-13 ASSESSMENT — PAIN SCALES - GENERAL
PAINLEVEL_OUTOF10: 3
PAINLEVEL_OUTOF10: 0
PAINLEVEL_OUTOF10: 2

## 2022-03-13 ASSESSMENT — PAIN DESCRIPTION - PROGRESSION: CLINICAL_PROGRESSION: RESOLVED

## 2022-03-13 NOTE — PROGRESS NOTES
Progress Note    SUBJECTIVE:   Pt comfortable; +void; +flatus; +ambulation; decreased vaginal bleeding    OBJECTIVE:    VITALS:  BP (!) 118/56   Pulse 87   Temp 98.6 °F (37 °C) (Oral)   Resp 18   Ht 5' 7\" (1.702 m)   Wt 228 lb (103.4 kg)   SpO2 99%   Breastfeeding Unknown   BMI 35.71 kg/m²   Physical Exam  ABDOMEN:  normal bowel sounds, non-distended, non-tender and uterus firm  Ext nt    DATA:      ASSESSMENT AND PLAN:  S/p   Principal Problem:    39 weeks gestation of pregnancy  Resolved Problems:    * No resolved hospital problems.  *      PPD#1  Plan discharge home tomorrow

## 2022-03-13 NOTE — PROGRESS NOTES
[facility] [place] [facility] screening results were discussed with parent. Questions answered. Brochure given to parent. Advised to monitor developmental milestones and contact physician for any concerns.    Chiki Archibald, AuD

## 2022-03-13 NOTE — ANESTHESIA POSTPROCEDURE EVALUATION
Department of Anesthesiology  Postprocedure Note    Patient: Keyla Roper  MRN: [id number]  [place]: 1995  Date of evaluation: 3/13/2022  Time:  7:03 AM     Procedure Summary     Date: 03/12/22 Room / Location:     Anesthesia Start: 0440 Anesthesia Stop: 1466    Procedure: Labor Analgesia Diagnosis:     Scheduled Providers:  Responsible Provider: Meme Marx MD    Anesthesia Type: general, spinal, epidural ASA Status: 2          Anesthesia Type: general, spinal, epidural    Thao Phase I:      Thao Phase II:      Last vitals: Reviewed and per EMR flowsheets.        Anesthesia Post Evaluation    Patient location during evaluation: floor  Patient participation: complete - patient participated  Level of consciousness: awake  Airway patency: patent  Nausea & Vomiting: no nausea and no vomiting  Complications: no  Cardiovascular status: hemodynamically stable  Respiratory status: acceptable  Hydration status: stable

## 2022-03-13 NOTE — LACTATION NOTE
Assisted mom with positioning and latch using a shield, baby nursed well for ten minutes. Mom wishes to go home today. Encouraged frequent feeds at breast, hand expression and skin to skin to establish supply. Support provided and encouraged to call with any needs.

## 2022-03-14 VITALS
RESPIRATION RATE: 16 BRPM | SYSTOLIC BLOOD PRESSURE: 119 MMHG | BODY MASS INDEX: 35.79 KG/M2 | HEIGHT: 67 IN | DIASTOLIC BLOOD PRESSURE: 55 MMHG | HEART RATE: 93 BPM | WEIGHT: 228 LBS | OXYGEN SATURATION: 98 % | TEMPERATURE: 98.3 F

## 2022-03-14 PROCEDURE — 6370000000 HC RX 637 (ALT 250 FOR IP): Performed by: OBSTETRICS & GYNECOLOGY

## 2022-03-14 RX ADMIN — DOCUSATE SODIUM 100 MG: 100 CAPSULE, LIQUID FILLED ORAL at 10:22

## 2022-03-14 RX ADMIN — IBUPROFEN 800 MG: 800 TABLET, FILM COATED ORAL at 02:08

## 2022-03-14 RX ADMIN — LEVOTHYROXINE SODIUM 100 MCG: 100 TABLET ORAL at 06:29

## 2022-03-14 ASSESSMENT — PAIN SCALES - GENERAL: PAINLEVEL_OUTOF10: 3

## 2022-03-14 NOTE — PLAN OF CARE
Problem: Anxiety:  Goal: Level of anxiety will decrease  Description: Level of anxiety will decrease  Outcome: Met This Shift     Problem: Breathing Pattern - Ineffective:  Goal: Able to breathe comfortably  Description: Able to breathe comfortably  Outcome: Met This Shift     Problem: Fluid Volume - Imbalance:  Goal: Absence of imbalanced fluid volume signs and symptoms  Description: Absence of imbalanced fluid volume signs and symptoms  Outcome: Met This Shift     Problem: Infection - Intrapartum Infection:  Goal: Will show no infection signs and symptoms  Description: Will show no infection signs and symptoms  Outcome: Met This Shift     Problem: Pain - Acute:  Goal: Pain level will decrease  Description: Pain level will decrease  Outcome: Met This Shift  Goal: Able to cope with pain  Description: Able to cope with pain  Outcome: Met This Shift     Problem: Tissue Perfusion - Uteroplacental, Altered:  Goal: Absence of abnormal fetal heart rate pattern  Description: Absence of abnormal fetal heart rate pattern  Outcome: Met This Shift     Problem: Urinary Retention:  Goal: Experiences of bladder distention will decrease  Description: Experiences of bladder distention will decrease  Outcome: Met This Shift  Goal: Urinary elimination within specified parameters  Description: Urinary elimination within specified parameters  Outcome: Met This Shift     Problem: Falls - Risk of:  Goal: Will remain free from falls  Description: Will remain free from falls  Outcome: Met This Shift  Goal: Absence of physical injury  Description: Absence of physical injury  Outcome: Met This Shift     Problem: Sensory:  Goal: Pain level will decrease  Description: Pain level will decrease  Outcome: Met This Shift  Goal: Ability to identify pain intensity on a pain scale and rate it consistently will improve  Description: Ability to identify pain intensity on a pain scale and rate it consistently will improve  Outcome: Met This Shift  Goal: Ability to maintain vital signs within normal range will improve  Description: Ability to maintain vital signs within normal range will improve  Outcome: Met This Shift     Problem: Breastfeeding - Ineffective:  Goal: Infant able to latch onto breast  Description: Infant able to latch onto breast  Outcome: Met This Shift  Goal: Uninterrupted skin-to-skin contact during initial breast-feeding if medically possible  Description: Uninterrupted skin-to-skin contact during initial breast-feeding if medically possible  Outcome: Met This Shift  Goal: Urine and stool output as expected for age  Description: Urine and stool output as expected for age  Outcome: Met This Shift  Goal: Weight loss within specified parameters for age  Description: Weight loss within specified parameters for age  Outcome: Met This Shift     Problem: Pain:  Goal: Pain level will decrease  Description: Pain level will decrease  Outcome: Met This Shift  Goal: Control of acute pain  Description: Control of acute pain  Outcome: Met This Shift  Goal: Control of chronic pain  Description: Control of chronic pain  Outcome: Met This Shift

## 2022-03-14 NOTE — PLAN OF CARE
Problem: Anxiety:  Goal: Level of anxiety will decrease  Description: Level of anxiety will decrease  3/14/2022 1255 by Familia Araya RN  Outcome: Completed  3/14/2022 06 by Nilson Briseno RN  Outcome: Met This Shift     Problem: Breathing Pattern - Ineffective:  Goal: Able to breathe comfortably  Description: Able to breathe comfortably  3/14/2022 1255 by Familia Araya RN  Outcome: Completed  3/14/2022 06 by Nilson Briseno RN  Outcome: Met This Shift     Problem: Fluid Volume - Imbalance:  Goal: Absence of imbalanced fluid volume signs and symptoms  Description: Absence of imbalanced fluid volume signs and symptoms  3/14/2022 1255 by Familia Araya RN  Outcome: Completed  3/14/2022 06 by Nilson Briseno RN  Outcome: Met This Shift  Goal: Absence of intrapartum hemorrhage signs and symptoms  Description: Absence of intrapartum hemorrhage signs and symptoms  Outcome: Completed     Problem: Infection - Intrapartum Infection:  Goal: Will show no infection signs and symptoms  Description: Will show no infection signs and symptoms  3/14/2022 1255 by Familia Araya RN  Outcome: Completed  3/14/2022 06 by Nilson Briseno RN  Outcome: Met This Shift     Problem: Labor Process - Prolonged:  Goal: Labor progression, first stage, within specified pattern  Description: Labor progression, first stage, within specified pattern  Outcome: Completed  Goal: Labor progession, second stage, within specified pattern  Description: Labor progession, second stage, within specified pattern  Outcome: Completed  Goal: Uterine contractions within specified parameters  Description: Uterine contractions within specified parameters  Outcome: Completed     Problem:  Screening:  Goal: Ability to make informed decisions regarding treatment has improved  Description: Ability to make informed decisions regarding treatment has improved  Outcome: Completed     Problem: Pain - Acute:  Goal: Pain level will decrease  Description: Pain level will decrease  3/14/2022 1255 by Richy Magana RN  Outcome: Completed  3/14/2022 0604 by Wing Weiss RN  Outcome: Met This Shift  Goal: Able to cope with pain  Description: Able to cope with pain  3/14/2022 1255 by Richy Magana RN  Outcome: Completed  3/14/2022 0604 by Wing Weiss RN  Outcome: Met This Shift     Problem: Tissue Perfusion - Uteroplacental, Altered:  Goal: Absence of abnormal fetal heart rate pattern  Description: Absence of abnormal fetal heart rate pattern  3/14/2022 1255 by Richy Magana RN  Outcome: Completed  3/14/2022 0604 by Wing Weiss RN  Outcome: Met This Shift     Problem: Urinary Retention:  Goal: Experiences of bladder distention will decrease  Description: Experiences of bladder distention will decrease  3/14/2022 1255 by Richy Magana RN  Outcome: Completed  3/14/2022 0604 by Wing Weiss RN  Outcome: Met This Shift  Goal: Urinary elimination within specified parameters  Description: Urinary elimination within specified parameters  3/14/2022 1255 by Richy Magana RN  Outcome: Completed  3/14/2022 0604 by Wing Weiss RN  Outcome: Met This Shift     Problem: Falls - Risk of:  Goal: Will remain free from falls  Description: Will remain free from falls  3/14/2022 1255 by Richy Magana RN  Outcome: Completed  3/14/2022 0604 by Wing Weiss RN  Outcome: Met This Shift  Goal: Absence of physical injury  Description: Absence of physical injury  3/14/2022 1255 by Richy Magana RN  Outcome: Completed  3/14/2022 0604 by Wing Weiss RN  Outcome: Met This Shift     Problem: Sensory:  Goal: Pain level will decrease  Description: Pain level will decrease  3/14/2022 1255 by Richy Magana RN  Outcome: Completed  3/14/2022 0604 by Wing Weiss RN  Outcome: Met This Shift  Goal: Ability to identify pain intensity on a pain scale and rate it consistently will improve  Description: Ability to identify pain intensity on a pain scale and rate it consistently will improve  3/14/2022 1255 by Edie Yoandy, RN  Outcome: Completed  3/14/2022 0604 by Elizabeth Newsome RN  Outcome: Met This Shift  Goal: Ability to maintain vital signs within normal range will improve  Description: Ability to maintain vital signs within normal range will improve  3/14/2022 1255 by Edie Pitt RN  Outcome: Completed  3/14/2022 0604 by Elizabeth Newsome RN  Outcome: Met This Shift     Problem: Infant Care:  Goal: Avoidance of environmental tobacco smoke  Description: Avoidance of environmental tobacco smoke  Outcome: Completed     Problem: Breastfeeding - Ineffective:  Goal: Infant able to latch onto breast  Description: Infant able to latch onto breast  3/14/2022 1255 by Edie Pitt RN  Outcome: Completed  3/14/2022 0604 by Elizabeth Newsome RN  Outcome: Met This Shift  Goal: Intact skin on mother's nipple  Description: Intact skin on mother's nipple  Outcome: Completed  Goal: Uninterrupted skin-to-skin contact during initial breast-feeding if medically possible  Description: Uninterrupted skin-to-skin contact during initial breast-feeding if medically possible  3/14/2022 1255 by Edie Pitt RN  Outcome: Completed  3/14/2022 0604 by Elizabeth Newsome RN  Outcome: Met This Shift  Goal: Urine and stool output as expected for age  Description: Urine and stool output as expected for age  3/14/2022 1255 by Edie Pitt RN  Outcome: Completed  3/14/2022 0604 by Elizabeth Newsome RN  Outcome: Met This Shift  Goal: Weight loss within specified parameters for age  Description: Weight loss within specified parameters for age  3/14/2022 1255 by Edie Pitt RN  Outcome: Completed  3/14/2022 0604 by Elizabeth Newsome RN  Outcome: Met This Shift     Problem: Pain:  Goal: Pain level will decrease  Description: Pain level will decrease  3/14/2022 1255 by Edie Pitt RN  Outcome: Completed  3/14/2022 0604 by Elizabeth Newsome RN  Outcome: Met This Shift  Goal: Control of acute pain  Description: Control of acute pain  3/14/2022 1255 by Edie Pitt RN  Outcome: Completed  3/14/2022 0604 by Juma Marie RN  Outcome: Met This Shift  Goal: Control of chronic pain  Description: Control of chronic pain  3/14/2022 1255 by Kirsten Jay RN  Outcome: Completed  3/14/2022 0604 by Juma Marie RN  Outcome: Met This Shift

## 2022-03-14 NOTE — PROGRESS NOTES
Postpartum Day 2: Vaginal Delivery    The patient feels well. Pain is well controlled with current medications. Baby is feeding via breast.     Objective:        Vitals:    03/13/22 2308   BP: (!) 107/50   Pulse: 91   Resp: 16   Temp: 97.5 °F (36.4 °C)   SpO2: 96%         Lab Results   Component Value Date    WBC 11.0 03/10/2022    HGB 10.1 (L) 03/13/2022    HCT 31.7 (L) 03/13/2022    MCV 88.0 03/10/2022     03/10/2022       General:    alert, appears stated age and cooperative   Lochia:  appropriate   Uterine    firm   DVT Evaluation:  No evidence of DVT seen on physical exam.     Assessment:     Status post Vaginal Delivery. good    Plan:     Discharge home with standard precautions and return to clinic in 4-6 weeks.

## 2022-03-17 NOTE — DISCHARGE SUMMARY
Obstetrical Discharge Form    Gestational Age:40w2d    Antepartum complications: none    Date of Delivery: 3/12/22    Type of Delivery: vaginal, spontaneous    Delivered By:           Nash Hunter:   Information for the patient's :  Jacquelin Silversmith Boy Lennox Moros [[id number]]        Anesthesia: Epidural    Intrapartum complications: None    Feeding method: breast    Postpartum complications: none    Discharge Date: 3/14/2022    Plan:   Follow up in 6 week(s)  DC'd to home in good condition

## 2022-04-20 DIAGNOSIS — E03.9 HYPOTHYROIDISM, UNSPECIFIED TYPE: ICD-10-CM

## 2022-04-20 RX ORDER — LEVOTHYROXINE SODIUM 0.1 MG/1
100 TABLET ORAL DAILY
Qty: 30 TABLET | Refills: 0 | Status: SHIPPED
Start: 2022-04-20 | End: 2022-05-19 | Stop reason: SDUPTHER

## 2022-05-18 NOTE — PROGRESS NOTES
[facility]  Department of Family Medicine      Patient:  Chele Hairston 32 y.o. female     Date of Service: 5/18/22      Chief complaint:   Chief Complaint   Patient presents with    Establish Care         History ofPresent Illness   The patient is a 32 y.o. female  presented to the clinic with complaints as above. Here to establish    Overall, doing well  -has hx of hypothyroidism, on synthroid for this, ran out 5 days ago   -has 1 month old baby boy, Donal     Of note, works RN     Moving bowels and urinating normally  -Denies any lightheadedness, dizziness, chest pain, or SOB       Past Medical History:      Diagnosis Date    Hypothyroidism        PastSurgical History:    No past surgical history on file. Allergies:    Tuberculin    Social History:   Social History     Socioeconomic History    Marital status:      Spouse name: Not on file    Number of children: Not on file    Years of education: Not on file    Highest education level: Not on file   Occupational History    Not on file   Tobacco Use    Smoking status: Never Smoker    Smokeless tobacco: Never Used   Substance and Sexual Activity    Alcohol use: Not Currently    Drug use: Never    Sexual activity: Not on file   Other Topics Concern    Not on file   Social History Narrative    Not on file     Social Determinants of Health     Financial Resource Strain: Unknown    Difficulty of Paying Living Expenses: Patient refused   Food Insecurity: Unknown    Worried About [address] in the Last Year: Patient refused    [address] in the Last Year: Patient refused   Transportation Needs:     Lack of Transportation (Medical): Not on file    Lack of Transportation (Non-Medical):  Not on file   Physical Activity: Sufficiently Active    Days of Exercise per Week: 5 days    Minutes of Exercise per Session: 30 min   Stress:     Feeling of Stress : Not on file   Social Connections:     Frequency of Communication with Friends and Family: Not on file    Frequency of Social Gatherings with Friends and Family: Not on file    Attends Evangelical Services: Not on file    Active Member of Clubs or Organizations: Not on file    Attends Club or Organization Meetings: Not on file    Marital Status: Not on file   Intimate Partner Violence: Not At Risk    Fear of Current or Ex-Partner: No    Emotionally Abused: No    Physically Abused: No    Sexually Abused: No   Housing Stability:     Unable to Pay for Housing in the Last Year: Not on file    Number of [place] in the Last Year: Not on file    Unstable Housing in the Last Year: Not on file        Family History:       Problem Relation Age of Onset    Obesity Mother     Other Mother         GERD    Diabetes Mother     Depression Father     No Known Problems Brother        Review of Systems:   Review of Systems - as above     Physical Exam   Vitals: BP 98/60   Pulse 86   Temp 96.9 °F (36.1 °C) (Infrared)   Resp 18   Ht 5' 6\" (1.676 m)   Wt 219 lb (99.3 kg)   LMP 04/13/2022   SpO2 99%   Breastfeeding Yes Comment: relactating  BMI 35.35 kg/m²   Physical Exam  Constitutional:       Appearance: She is well-developed. HENT:      Head: Normocephalic. Cardiovascular:      Rate and Rhythm: Normal rate and regular rhythm. Heart sounds: Normal heart sounds. No murmur heard. Pulmonary:      Effort: Pulmonary effort is normal. No respiratory distress. Breath sounds: Normal breath sounds. No wheezing. Abdominal:      General: Bowel sounds are normal.      Palpations: Abdomen is soft. Musculoskeletal:         General: No tenderness. Normal range of motion. Skin:     General: Skin is warm and dry. Neurological:      Mental Status: She is alert and oriented to person, place, and time. Psychiatric:         Behavior: Behavior normal.             Assessment and Plan       1.  Annual physical exam  Overall doing well, needs labs as below  - CBC; Future  - Comprehensive Metabolic Panel; Future    2. Anemia, unspecified type  Incidental finding, will get repeat to see if this has improved  - CBC; Future    3. Hypothyroidism, unspecified type  F/u of chronic issue  -Has been out of synthroid for several days, no longer pregnant, will refill current synthroid dose and get repeat TSH, will adjust synthroid accordingly once TSH is back   - TSH; Future  - levothyroxine (SYNTHROID) 100 MCG tablet; Take 1 tablet by mouth Daily  Dispense: 30 tablet; Refill: 0  - TSH; Future      Counseled regarding above diagnosis, including possible risks and complications,  especially if left uncontrolled. Counseled regarding the possible side effects, risks, benefits and alternatives to treatment;patient and/or guardian verbalizes understanding, agrees, feels comfortable with and wishes to proceed with above treatment plan. Call or go to [address] if symptoms worsen or persist. Advised patient to call with any new medication issues, and, as applicable, read all Rx info from pharmacy to assure aware of all possible risks and side effects of medicationbefore taking. Patient and/or guardian given opportunity to ask questions/raise concerns. The patient verbalized comfort and understanding of instructions. I encourage further reading and education about your health conditions. Information on many health conditions is provided by [facility] [employer]: https://familydoctor. org/  Please bring any questions to me at your nextvisit. Return to Office: Return in about 1 year (around 5/19/2023) for annual physical .    Medication List:    Current Outpatient Medications   Medication Sig Dispense Refill    levothyroxine (SYNTHROID) 100 MCG tablet Take 1 tablet by mouth Daily 30 tablet 0     No current facility-administered medications for this visit.         Farheen Valencia, DO       This document may have been prepared at least partially through the use of voice recognition software. Although effort is taken to assure the accuracy ofthis document, it is possible that grammatical, syntax,  or spelling errors may occur.

## 2022-05-19 ENCOUNTER — OFFICE VISIT (OUTPATIENT)
Dept: PRIMARY CARE CLINIC | Age: 27
End: 2022-05-19
Payer: COMMERCIAL

## 2022-05-19 VITALS
DIASTOLIC BLOOD PRESSURE: 60 MMHG | TEMPERATURE: 96.9 F | HEART RATE: 86 BPM | SYSTOLIC BLOOD PRESSURE: 98 MMHG | OXYGEN SATURATION: 99 % | HEIGHT: 66 IN | BODY MASS INDEX: 35.2 KG/M2 | RESPIRATION RATE: 18 BRPM | WEIGHT: 219 LBS

## 2022-05-19 DIAGNOSIS — E03.9 HYPOTHYROIDISM, UNSPECIFIED TYPE: ICD-10-CM

## 2022-05-19 DIAGNOSIS — Z00.00 ANNUAL PHYSICAL EXAM: Primary | ICD-10-CM

## 2022-05-19 DIAGNOSIS — D64.9 ANEMIA, UNSPECIFIED TYPE: ICD-10-CM

## 2022-05-19 DIAGNOSIS — Z00.00 ANNUAL PHYSICAL EXAM: ICD-10-CM

## 2022-05-19 LAB
ALBUMIN SERPL-MCNC: 4.3 G/DL (ref 3.5–5.2)
ALP BLD-CCNC: 99 U/L (ref 35–104)
ALT SERPL-CCNC: 19 U/L (ref 0–32)
ANION GAP SERPL CALCULATED.3IONS-SCNC: 6 MMOL/L (ref 7–16)
AST SERPL-CCNC: 21 U/L (ref 0–31)
BILIRUB SERPL-MCNC: 0.6 MG/DL (ref 0–1.2)
BUN BLDV-MCNC: 12 MG/DL (ref 6–20)
CALCIUM SERPL-MCNC: 9.2 MG/DL (ref 8.6–10.2)
CHLORIDE BLD-SCNC: 105 MMOL/L (ref 98–107)
CO2: 26 MMOL/L (ref 22–29)
CREAT SERPL-MCNC: 0.5 MG/DL (ref 0.5–1)
GFR AFRICAN AMERICAN: >60
GFR NON-AFRICAN AMERICAN: >60 ML/MIN/1.73
GLUCOSE BLD-MCNC: 89 MG/DL (ref 74–99)
HCT VFR BLD CALC: 43.8 % (ref 34–48)
HEMOGLOBIN: 13.1 G/DL (ref 11.5–15.5)
MCH RBC QN AUTO: 25.8 PG (ref 26–35)
MCHC RBC AUTO-ENTMCNC: 29.9 % (ref 32–34.5)
MCV RBC AUTO: 86.4 FL (ref 80–99.9)
PDW BLD-RTO: 12.9 FL (ref 11.5–15)
PLATELET # BLD: 291 E9/L (ref 130–450)
PMV BLD AUTO: 10.3 FL (ref 7–12)
POTASSIUM SERPL-SCNC: 4.3 MMOL/L (ref 3.5–5)
RBC # BLD: 5.07 E12/L (ref 3.5–5.5)
SODIUM BLD-SCNC: 137 MMOL/L (ref 132–146)
TOTAL PROTEIN: 7.2 G/DL (ref 6.4–8.3)
TSH SERPL DL<=0.05 MIU/L-ACNC: <0.01 UIU/ML (ref 0.27–4.2)
WBC # BLD: 5.6 E9/L (ref 4.5–11.5)

## 2022-05-19 PROCEDURE — 99395 PREV VISIT EST AGE 18-39: CPT | Performed by: STUDENT IN AN ORGANIZED HEALTH CARE EDUCATION/TRAINING PROGRAM

## 2022-05-19 RX ORDER — LEVOTHYROXINE SODIUM 0.1 MG/1
100 TABLET ORAL DAILY
Qty: 30 TABLET | Refills: 0 | Status: SHIPPED
Start: 2022-05-19 | End: 2022-05-20

## 2022-05-19 SDOH — HEALTH STABILITY: PHYSICAL HEALTH: ON AVERAGE, HOW MANY DAYS PER WEEK DO YOU ENGAGE IN MODERATE TO STRENUOUS EXERCISE (LIKE A BRISK WALK)?: 5 DAYS

## 2022-05-19 SDOH — HEALTH STABILITY: PHYSICAL HEALTH: ON AVERAGE, HOW MANY MINUTES DO YOU ENGAGE IN EXERCISE AT THIS LEVEL?: 30 MIN

## 2022-05-19 ASSESSMENT — SOCIAL DETERMINANTS OF HEALTH (SDOH)

## 2022-05-20 DIAGNOSIS — E03.9 HYPOTHYROIDISM, UNSPECIFIED TYPE: ICD-10-CM

## 2022-05-20 DIAGNOSIS — E03.9 HYPOTHYROIDISM, UNSPECIFIED TYPE: Primary | ICD-10-CM

## 2022-05-20 RX ORDER — LEVOTHYROXINE SODIUM 0.05 MG/1
50 TABLET ORAL DAILY
Qty: 30 TABLET | Refills: 1 | Status: SHIPPED
Start: 2022-05-20 | End: 2022-08-23

## 2022-07-01 ENCOUNTER — NURSE ONLY (OUTPATIENT)
Dept: PRIMARY CARE CLINIC | Age: 27
End: 2022-07-01

## 2022-07-01 DIAGNOSIS — Z01.89 ROUTINE LAB DRAW: Primary | ICD-10-CM

## 2022-07-01 DIAGNOSIS — E03.9 HYPOTHYROIDISM, UNSPECIFIED TYPE: ICD-10-CM

## 2022-07-01 LAB — TSH SERPL DL<=0.05 MIU/L-ACNC: 50.47 UIU/ML (ref 0.27–4.2)

## 2022-07-05 ENCOUNTER — TELEPHONE (OUTPATIENT)
Dept: PRIMARY CARE CLINIC | Age: 27
End: 2022-07-05

## 2022-07-05 DIAGNOSIS — E03.9 HYPOTHYROIDISM, UNSPECIFIED TYPE: Primary | ICD-10-CM

## 2022-07-05 NOTE — TELEPHONE ENCOUNTER
Pt notified and scheduled lab appointment. Pt stated during her last office visit with you, you both agreed that she stop taking her medication to see how her levels would be- pt stated she doesn't want you to think she was being non compliant. Also, since she has no family hx of hypothyroidism, she is wondering if it is necessary for her to get u/s or any other testing done for it.

## 2022-07-05 NOTE — TELEPHONE ENCOUNTER
I believe we both thought maybe it was pregnancy induced, however no it is likely due to something else, and if she wants we can further evaluate with lab work.

## 2022-07-05 NOTE — TELEPHONE ENCOUNTER
Pt returned call regarding thyroid level. Informed her what you wrote regarding it being low.  Pt stated she started taking her medication again on Saturday 07/02/2022

## 2022-07-06 DIAGNOSIS — E03.9 HYPOTHYROIDISM, UNSPECIFIED TYPE: Primary | ICD-10-CM

## 2022-08-19 ENCOUNTER — NURSE ONLY (OUTPATIENT)
Dept: PRIMARY CARE CLINIC | Age: 27
End: 2022-08-19

## 2022-08-19 DIAGNOSIS — E03.9 HYPOTHYROIDISM, UNSPECIFIED TYPE: ICD-10-CM

## 2022-08-19 DIAGNOSIS — Z01.89 ROUTINE LAB DRAW: Primary | ICD-10-CM

## 2022-08-19 LAB — TSH SERPL DL<=0.05 MIU/L-ACNC: 43.37 UIU/ML (ref 0.27–4.2)

## 2022-08-23 DIAGNOSIS — E03.9 HYPOTHYROIDISM, UNSPECIFIED TYPE: ICD-10-CM

## 2022-08-23 RX ORDER — LEVOTHYROXINE SODIUM 0.1 MG/1
50 TABLET ORAL DAILY
Qty: 42 TABLET | Refills: 0 | Status: SHIPPED
Start: 2022-08-23 | End: 2022-08-25 | Stop reason: SDUPTHER

## 2022-08-24 LAB — THYROID PEROXIDASE (TPO) ABS: 259 IU/ML (ref 0–25)

## 2022-08-25 ENCOUNTER — TELEPHONE (OUTPATIENT)
Dept: PRIMARY CARE CLINIC | Age: 27
End: 2022-08-25

## 2022-08-25 DIAGNOSIS — E03.9 HYPOTHYROIDISM, UNSPECIFIED TYPE: ICD-10-CM

## 2022-08-25 RX ORDER — LEVOTHYROXINE SODIUM 0.1 MG/1
100 TABLET ORAL DAILY
Qty: 42 TABLET | Refills: 0 | Status: SHIPPED
Start: 2022-08-25 | End: 2022-09-09 | Stop reason: SDUPTHER

## 2022-08-25 NOTE — TELEPHONE ENCOUNTER
Please call patient to discuss TPO antibody. Also synthroid was written incorrectly. She was told to take 100 mcg daily and the sig was written for 50 mcg daily.     Thanks

## 2022-08-25 NOTE — TELEPHONE ENCOUNTER
Spoke with patient and correctly re ordered her synthroid. Patient's questions were answered.     Thank Maricruz Ann

## 2022-09-09 DIAGNOSIS — E03.9 HYPOTHYROIDISM, UNSPECIFIED TYPE: ICD-10-CM

## 2022-09-09 RX ORDER — LEVOTHYROXINE SODIUM 0.1 MG/1
100 TABLET ORAL DAILY
Qty: 42 TABLET | Refills: 0 | Status: SHIPPED
Start: 2022-09-09 | End: 2022-10-17

## 2022-10-14 ENCOUNTER — NURSE ONLY (OUTPATIENT)
Dept: PRIMARY CARE CLINIC | Age: 27
End: 2022-10-14

## 2022-10-14 DIAGNOSIS — E03.9 HYPOTHYROIDISM, UNSPECIFIED TYPE: ICD-10-CM

## 2022-10-14 DIAGNOSIS — Z01.89 ROUTINE LAB DRAW: Primary | ICD-10-CM

## 2022-10-14 LAB — TSH SERPL DL<=0.05 MIU/L-ACNC: 18.19 UIU/ML (ref 0.27–4.2)

## 2022-10-17 DIAGNOSIS — E03.9 HYPOTHYROIDISM, UNSPECIFIED TYPE: ICD-10-CM

## 2022-10-17 RX ORDER — LEVOTHYROXINE SODIUM 112 UG/1
112 TABLET ORAL DAILY
Qty: 30 TABLET | Refills: 2 | Status: SHIPPED | OUTPATIENT
Start: 2022-10-17

## 2022-11-12 ENCOUNTER — E-VISIT (OUTPATIENT)
Dept: PRIMARY CARE CLINIC | Age: 27
End: 2022-11-12
Payer: COMMERCIAL

## 2022-11-12 DIAGNOSIS — E03.9 HYPOTHYROIDISM, UNSPECIFIED TYPE: ICD-10-CM

## 2022-11-12 DIAGNOSIS — R30.0 DYSURIA: Primary | ICD-10-CM

## 2022-11-12 PROCEDURE — 99422 OL DIG E/M SVC 11-20 MIN: CPT | Performed by: NURSE PRACTITIONER

## 2022-11-12 RX ORDER — CEPHALEXIN 500 MG/1
500 CAPSULE ORAL EVERY 6 HOURS
Qty: 28 CAPSULE | Refills: 0 | Status: SHIPPED | OUTPATIENT
Start: 2022-11-12 | End: 2022-11-19

## 2022-11-14 RX ORDER — LEVOTHYROXINE SODIUM 112 UG/1
112 TABLET ORAL DAILY
Qty: 30 TABLET | Refills: 2 | Status: SHIPPED | OUTPATIENT
Start: 2022-11-14

## 2022-12-15 LAB — TSH SERPL DL<=0.05 MIU/L-ACNC: 8.99 UIU/ML

## 2022-12-16 ENCOUNTER — TELEPHONE (OUTPATIENT)
Dept: PRIMARY CARE CLINIC | Age: 27
End: 2022-12-16

## 2022-12-16 DIAGNOSIS — E03.9 HYPOTHYROIDISM, UNSPECIFIED TYPE: ICD-10-CM

## 2022-12-16 RX ORDER — LEVOTHYROXINE SODIUM 0.12 MG/1
125 TABLET ORAL DAILY
Qty: 30 TABLET | Refills: 1 | Status: SHIPPED | OUTPATIENT
Start: 2022-12-16

## 2022-12-16 NOTE — TELEPHONE ENCOUNTER
Patient called for a refill on the below     levothyroxine (SYNTHROID) 112 MCG tablet      Waiting on lab results from [address] [place] [address]  Patient stated Dr Aundrea Alfaro raising the dosage

## 2023-01-25 NOTE — LACTATION NOTE
Addended by: AVTAR BHATT on: 1/25/2023 10:51 AM     Modules accepted: Orders     This mother has been breastfeeding and following with formula supplementation some of the time. Attempting BF at this time with 24mm NS. She is having trouble keeping NS over nipple and latching baby to not disturb shield position. Together we attempted without NS and baby was off and on with latch. 20mm NS provided, half inverted to apply to Nipple. This NS stayed in place well and baby maintained a successful feeding for 15 min before falling asleep at the breast. Encouraged frequent feeds to establish milk supply. Reviewed benefits and safety of skin to skin. Inst on adequate I/O and importance of keeping track of diapers at home. Instructed on signs of dehydration such as infant refusing to feed, decreased wet diapers and infant becoming listless and notify provider if these occur. Reviewed with mom the importance of notifying the physician if baby looks more jaundiced. Lactation office # given if follow-up needed, as well as other helpful resources. Encouraged to call with any concerns. Support and encouragement given. [employer] rick promoted for download and reference once home.

## 2023-02-06 DIAGNOSIS — E03.9 HYPOTHYROIDISM, UNSPECIFIED TYPE: ICD-10-CM

## 2023-02-06 RX ORDER — LEVOTHYROXINE SODIUM 0.12 MG/1
TABLET ORAL
Qty: 30 TABLET | Refills: 1 | Status: SHIPPED | OUTPATIENT
Start: 2023-02-06

## 2023-03-09 ENCOUNTER — TELEPHONE (OUTPATIENT)
Dept: PRIMARY CARE CLINIC | Age: 28
End: 2023-03-09

## 2023-03-09 DIAGNOSIS — E03.9 HYPOTHYROIDISM, UNSPECIFIED TYPE: Primary | ICD-10-CM

## 2023-03-09 NOTE — TELEPHONE ENCOUNTER
----- Message from Leticia Whitfield sent at 3/9/2023 10:52 AM EST -----  Subject: Referral Request    Reason for referral request? TSH   Provider patient wants to be referred to(if known):     Provider Phone Number(if known):     Additional Information for Provider?   ---------------------------------------------------------------------------  --------------  [address]    [phone]; OK to leave message on voicemail  ---------------------------------------------------------------------------  --------------

## 2023-04-06 DIAGNOSIS — E03.9 HYPOTHYROIDISM, UNSPECIFIED TYPE: ICD-10-CM

## 2023-04-06 RX ORDER — LEVOTHYROXINE SODIUM 0.12 MG/1
TABLET ORAL
Qty: 30 TABLET | Refills: 1 | Status: SHIPPED | OUTPATIENT
Start: 2023-04-06

## 2023-04-25 ENCOUNTER — OFFICE VISIT (OUTPATIENT)
Dept: PRIMARY CARE CLINIC | Age: 28
End: 2023-04-25
Payer: COMMERCIAL

## 2023-04-25 VITALS
WEIGHT: 230.2 LBS | DIASTOLIC BLOOD PRESSURE: 60 MMHG | RESPIRATION RATE: 18 BRPM | HEART RATE: 77 BPM | BODY MASS INDEX: 37 KG/M2 | TEMPERATURE: 97.3 F | OXYGEN SATURATION: 97 % | HEIGHT: 66 IN | SYSTOLIC BLOOD PRESSURE: 98 MMHG

## 2023-04-25 DIAGNOSIS — E03.9 HYPOTHYROIDISM, UNSPECIFIED TYPE: ICD-10-CM

## 2023-04-25 DIAGNOSIS — E66.9 OBESITY (BMI 30-39.9): ICD-10-CM

## 2023-04-25 DIAGNOSIS — E03.9 HYPOTHYROIDISM, UNSPECIFIED TYPE: Primary | ICD-10-CM

## 2023-04-25 PROBLEM — Z3A.39 39 WEEKS GESTATION OF PREGNANCY: Status: RESOLVED | Noted: 2022-03-10 | Resolved: 2023-04-25

## 2023-04-25 LAB — TSH SERPL-MCNC: 5.74 UIU/ML (ref 0.27–4.2)

## 2023-04-25 PROCEDURE — 99214 OFFICE O/P EST MOD 30 MIN: CPT | Performed by: STUDENT IN AN ORGANIZED HEALTH CARE EDUCATION/TRAINING PROGRAM

## 2023-04-25 SDOH — ECONOMIC STABILITY: FOOD INSECURITY: WITHIN THE PAST 12 MONTHS, YOU WORRIED THAT YOUR FOOD WOULD RUN OUT BEFORE YOU GOT MONEY TO BUY MORE.: NEVER TRUE

## 2023-04-25 SDOH — ECONOMIC STABILITY: FOOD INSECURITY: WITHIN THE PAST 12 MONTHS, THE FOOD YOU BOUGHT JUST DIDN'T LAST AND YOU DIDN'T HAVE MONEY TO GET MORE.: NEVER TRUE

## 2023-04-25 SDOH — ECONOMIC STABILITY: TRANSPORTATION INSECURITY
IN THE PAST 12 MONTHS, HAS LACK OF TRANSPORTATION KEPT YOU FROM MEETINGS, WORK, OR FROM GETTING THINGS NEEDED FOR DAILY LIVING?: NO

## 2023-04-25 SDOH — ECONOMIC STABILITY: HOUSING INSECURITY
IN THE LAST 12 MONTHS, WAS THERE A TIME WHEN YOU DID NOT HAVE A STEADY PLACE TO SLEEP OR SLEPT IN A SHELTER (INCLUDING NOW)?: NO

## 2023-04-25 SDOH — ECONOMIC STABILITY: INCOME INSECURITY: HOW HARD IS IT FOR YOU TO PAY FOR THE VERY BASICS LIKE FOOD, HOUSING, MEDICAL CARE, AND HEATING?: NOT HARD AT ALL

## 2023-04-25 ASSESSMENT — PATIENT HEALTH QUESTIONNAIRE - PHQ9
SUM OF ALL RESPONSES TO PHQ9 QUESTIONS 1 & 2: 0
SUM OF ALL RESPONSES TO PHQ QUESTIONS 1-9: 0
SUM OF ALL RESPONSES TO PHQ QUESTIONS 1-9: 0
1. LITTLE INTEREST OR PLEASURE IN DOING THINGS: 0
SUM OF ALL RESPONSES TO PHQ QUESTIONS 1-9: 0
SUM OF ALL RESPONSES TO PHQ QUESTIONS 1-9: 0
2. FEELING DOWN, DEPRESSED OR HOPELESS: 0

## 2023-04-25 NOTE — PROGRESS NOTES
Counseled regarding the possible side effects, risks, benefits and alternatives to treatment;patient and/or guardian verbalizes understanding, agrees, feels comfortable with and wishes to proceed with above treatment plan. Call or go to [address] if symptoms worsen or persist. Advised patient to call with any new medication issues, and, as applicable, read all Rx info from pharmacy to assure aware of all possible risks and side effects of medicationbefore taking. Patient and/or guardian given opportunity to ask questions/raise concerns. The patient verbalized comfort and understanding ofinstructions. I encourage further reading and education about your health conditions. Information on many health conditions is provided by [facility] [employer] of [employer]: [email or web address]. org/  Please bring any questions to me at your nextvisit. Return to Office: Return in about 6 months (around 10/25/2023) for f/u thyroid and weight . Medication List:    Current Outpatient Medications   Medication Sig Dispense Refill    levothyroxine (SYNTHROID) 125 MCG tablet take 1 tablet by mouth once daily 30 tablet 1     No current facility-administered medications for this visit. Tawny Bray, DO       This document may have been prepared at least partially through the use of voice recognition software. Although effort is taken to assure the accuracy ofthis document, it is possible that grammatical, syntax,  or spelling errors may occur.

## 2023-06-09 DIAGNOSIS — E03.9 HYPOTHYROIDISM, UNSPECIFIED TYPE: ICD-10-CM

## 2023-06-09 RX ORDER — LEVOTHYROXINE SODIUM 0.12 MG/1
TABLET ORAL
Qty: 30 TABLET | Refills: 1 | Status: SHIPPED | OUTPATIENT
Start: 2023-06-09

## 2023-08-01 ENCOUNTER — TELEPHONE (OUTPATIENT)
Dept: PRIMARY CARE CLINIC | Age: 28
End: 2023-08-01

## 2023-08-01 DIAGNOSIS — E03.9 HYPOTHYROIDISM, UNSPECIFIED TYPE: Primary | ICD-10-CM

## 2023-08-01 NOTE — TELEPHONE ENCOUNTER
Patient called asking when she should get another TSH test done     Pts due for a refill soon and asking

## 2023-08-01 NOTE — TELEPHONE ENCOUNTER
Patient has 16 doses left.  I advised get it done within the next 14 days that why if the dose needs adjusted we have 48 hours to adjust. Patient understood

## 2023-08-07 DIAGNOSIS — E03.9 HYPOTHYROIDISM, UNSPECIFIED TYPE: ICD-10-CM

## 2023-08-07 RX ORDER — LEVOTHYROXINE SODIUM 0.12 MG/1
TABLET ORAL
Qty: 30 TABLET | Refills: 1 | Status: SHIPPED | OUTPATIENT
Start: 2023-08-07

## 2023-08-28 DIAGNOSIS — E03.9 HYPOTHYROIDISM, UNSPECIFIED TYPE: ICD-10-CM

## 2023-08-28 LAB — TSH SERPL DL<=0.05 MIU/L-ACNC: 4.79 UIU/ML

## 2023-10-07 DIAGNOSIS — E03.9 HYPOTHYROIDISM, UNSPECIFIED TYPE: ICD-10-CM

## 2023-10-09 RX ORDER — LEVOTHYROXINE SODIUM 0.12 MG/1
TABLET ORAL
Qty: 30 TABLET | Refills: 1 | Status: SHIPPED | OUTPATIENT
Start: 2023-10-09

## 2023-10-13 DIAGNOSIS — E03.9 HYPOTHYROIDISM, UNSPECIFIED TYPE: ICD-10-CM

## 2023-10-13 RX ORDER — LEVOTHYROXINE SODIUM 0.12 MG/1
125 TABLET ORAL DAILY
Qty: 30 TABLET | Refills: 1 | OUTPATIENT
Start: 2023-10-13

## 2023-12-03 DIAGNOSIS — E03.9 HYPOTHYROIDISM, UNSPECIFIED TYPE: ICD-10-CM

## 2023-12-04 DIAGNOSIS — E03.9 HYPOTHYROIDISM, UNSPECIFIED TYPE: ICD-10-CM

## 2023-12-04 RX ORDER — LEVOTHYROXINE SODIUM 0.12 MG/1
125 TABLET ORAL DAILY
Qty: 30 TABLET | Refills: 1 | Status: SHIPPED | OUTPATIENT
Start: 2023-12-04

## 2023-12-04 RX ORDER — LEVOTHYROXINE SODIUM 0.12 MG/1
125 TABLET ORAL DAILY
Qty: 30 TABLET | Refills: 1 | Status: SHIPPED
Start: 2023-12-04 | End: 2023-12-04

## 2023-12-08 ENCOUNTER — HOSPITAL ENCOUNTER (EMERGENCY)
Age: 28
Discharge: HOME | End: 2023-12-08
Payer: COMMERCIAL

## 2023-12-08 ENCOUNTER — APPOINTMENT (OUTPATIENT)
Dept: ULTRASOUND IMAGING | Age: 28
End: 2023-12-08
Payer: COMMERCIAL

## 2023-12-08 VITALS
DIASTOLIC BLOOD PRESSURE: 58 MMHG | SYSTOLIC BLOOD PRESSURE: 119 MMHG | OXYGEN SATURATION: 100 % | WEIGHT: 210 LBS | RESPIRATION RATE: 18 BRPM | TEMPERATURE: 97.5 F | HEART RATE: 76 BPM | BODY MASS INDEX: 32.96 KG/M2 | HEIGHT: 67 IN

## 2023-12-08 DIAGNOSIS — O41.8X11 SUBCHORIONIC HEMORRHAGE OF PLACENTA IN FIRST TRIMESTER, FETUS 1 OF MULTIPLE GESTATION: ICD-10-CM

## 2023-12-08 DIAGNOSIS — O20.9 VAGINAL BLEEDING BEFORE 22 WEEKS GESTATION: Primary | ICD-10-CM

## 2023-12-08 DIAGNOSIS — O46.8X1 SUBCHORIONIC HEMORRHAGE OF PLACENTA IN FIRST TRIMESTER, FETUS 1 OF MULTIPLE GESTATION: ICD-10-CM

## 2023-12-08 LAB
ABO + RH BLD: NORMAL
ALBUMIN SERPL-MCNC: 4.6 G/DL (ref 3.5–5.2)
ALP SERPL-CCNC: 67 U/L (ref 35–104)
ALT SERPL-CCNC: 19 U/L (ref 0–32)
ANION GAP SERPL CALCULATED.3IONS-SCNC: 11 MMOL/L (ref 7–16)
AST SERPL-CCNC: 19 U/L (ref 0–31)
B-HCG SERPL EIA 3RD IS-ACNC: ABNORMAL MIU/ML (ref 0–7)
BASOPHILS # BLD: 0.03 K/UL (ref 0–0.2)
BASOPHILS NFR BLD: 0 % (ref 0–2)
BILIRUB SERPL-MCNC: 0.5 MG/DL (ref 0–1.2)
BILIRUB UR QL STRIP: NEGATIVE
BUN SERPL-MCNC: 6 MG/DL (ref 6–20)
CALCIUM SERPL-MCNC: 9.6 MG/DL (ref 8.6–10.2)
CHLORIDE SERPL-SCNC: 96 MMOL/L (ref 98–107)
CLARITY UR: CLEAR
CO2 SERPL-SCNC: 22 MMOL/L (ref 22–29)
COLOR UR: YELLOW
COMMENT: ABNORMAL
CREAT SERPL-MCNC: 0.5 MG/DL (ref 0.5–1)
EOSINOPHIL # BLD: 0.24 K/UL (ref 0.05–0.5)
EOSINOPHILS RELATIVE PERCENT: 3 % (ref 0–6)
ERYTHROCYTE [DISTWIDTH] IN BLOOD BY AUTOMATED COUNT: 13.6 % (ref 11.5–15)
GFR SERPL CREATININE-BSD FRML MDRD: >60 ML/MIN/1.73M2
GLUCOSE SERPL-MCNC: 94 MG/DL (ref 74–99)
GLUCOSE UR STRIP-MCNC: NEGATIVE MG/DL
HCG, URINE, POC: POSITIVE
HCT VFR BLD AUTO: 42.5 % (ref 34–48)
HGB BLD-MCNC: 14.1 G/DL (ref 11.5–15.5)
HGB UR QL STRIP.AUTO: NEGATIVE
IMM GRANULOCYTES # BLD AUTO: 0.03 K/UL (ref 0–0.58)
IMM GRANULOCYTES NFR BLD: 0 % (ref 0–5)
KETONES UR STRIP-MCNC: NEGATIVE MG/DL
LEUKOCYTE ESTERASE UR QL STRIP: NEGATIVE
LYMPHOCYTES NFR BLD: 2.55 K/UL (ref 1.5–4)
LYMPHOCYTES RELATIVE PERCENT: 29 % (ref 20–42)
Lab: NORMAL
MCH RBC QN AUTO: 28.3 PG (ref 26–35)
MCHC RBC AUTO-ENTMCNC: 33.2 G/DL (ref 32–34.5)
MCV RBC AUTO: 85.3 FL (ref 80–99.9)
MONOCYTES NFR BLD: 0.76 K/UL (ref 0.1–0.95)
MONOCYTES NFR BLD: 9 % (ref 2–12)
NEGATIVE QC PASS/FAIL: NORMAL
NEUTROPHILS NFR BLD: 59 % (ref 43–80)
NEUTS SEG NFR BLD: 5.17 K/UL (ref 1.8–7.3)
NITRITE UR QL STRIP: NEGATIVE
PH UR STRIP: 6 [PH] (ref 5–9)
PLATELET # BLD AUTO: 297 K/UL (ref 130–450)
PMV BLD AUTO: 9.6 FL (ref 7–12)
POSITIVE QC PASS/FAIL: NORMAL
POTASSIUM SERPL-SCNC: 3.6 MMOL/L (ref 3.5–5)
PROT SERPL-MCNC: 8 G/DL (ref 6.4–8.3)
PROT UR STRIP-MCNC: NEGATIVE MG/DL
RBC # BLD AUTO: 4.98 M/UL (ref 3.5–5.5)
SODIUM SERPL-SCNC: 129 MMOL/L (ref 132–146)
SP GR UR STRIP: <1.005 (ref 1–1.03)
UROBILINOGEN UR STRIP-ACNC: 0.2 EU/DL (ref 0–1)
WBC OTHER # BLD: 8.8 K/UL (ref 4.5–11.5)

## 2023-12-08 PROCEDURE — 99284 EMERGENCY DEPT VISIT MOD MDM: CPT

## 2023-12-08 PROCEDURE — 80053 COMPREHEN METABOLIC PANEL: CPT

## 2023-12-08 PROCEDURE — 81003 URINALYSIS AUTO W/O SCOPE: CPT

## 2023-12-08 PROCEDURE — 86901 BLOOD TYPING SEROLOGIC RH(D): CPT

## 2023-12-08 PROCEDURE — 84702 CHORIONIC GONADOTROPIN TEST: CPT

## 2023-12-08 PROCEDURE — 76801 OB US < 14 WKS SINGLE FETUS: CPT

## 2023-12-08 PROCEDURE — 86900 BLOOD TYPING SEROLOGIC ABO: CPT

## 2023-12-08 PROCEDURE — 85025 COMPLETE CBC W/AUTO DIFF WBC: CPT

## 2023-12-08 RX ORDER — IBUPROFEN 600 MG/1
600 TABLET ORAL ONCE
Status: DISCONTINUED | OUTPATIENT
Start: 2023-12-08 | End: 2023-12-08

## 2023-12-08 ASSESSMENT — LIFESTYLE VARIABLES
HOW MANY STANDARD DRINKS CONTAINING ALCOHOL DO YOU HAVE ON A TYPICAL DAY: PATIENT DOES NOT DRINK
HOW OFTEN DO YOU HAVE A DRINK CONTAINING ALCOHOL: NEVER

## 2023-12-09 NOTE — ED PROVIDER NOTES
Independent JUSTA Visit. [facility]   ED  Encounter Note  Admit Date/RoomTime: 2023  7:25 PM  ED Room: WAITING RESULTS/WAITING *    NAME: Indra Pinon  : 1995  MRN: [id number]     Chief Complaint:  Vaginal Bleeding (Second pregnancy- started spotting this afternoon. )    History of Present Illness       Indra Pinon is a 29 y.o. old female who presents to the emergency department by private vehicle for abnormal bleeding, which occured a few hour(s) prior to arrival.  Since onset the symptoms have been intermittent and mild in severity. Symptoms are associated with no additional symptoms and denies any abdominal pain, back pain, chest pain, shortness of breath, fever, chills, nausea, vomiting, diarrhea, constipation, dark/black stools, or dysuria. She takes no blood thinning agents. Patient's last menstrual period was 10/08/2023. Tonia Pole . Patient states that she is approximately 8 weeks pregnant. She states she is scheduled in 2 weeks for her first OB appointment. She states that today she started having pink vaginal spotting. She states that she never had this with her previous pregnancy, and got concerned. Patient states that she did have an ultrasound the [facility] over a week ago and everything was normal.  Patient last menstrual cycle was 10/8/2023 denies chest pain or shortness of breath. No other complaint or concerns at this time. ROS   Pertinent positives and negatives are stated within HPI, all other systems reviewed and are negative. Past Medical History:  has a past medical history of Hypothyroidism. Surgical History:  has no past surgical history on file. Social History:  reports that she has never smoked. She has never used smokeless tobacco. She reports that she does not currently use alcohol. She reports that she does not use drugs.     Family History: family history includes Depression in her

## 2023-12-09 NOTE — DISCHARGE INSTRUCTIONS
Pelvic rest.  No sexual intercourse, douching or the use of tampons. Close follow-up with OB/GYN.   Return to the emergency department for any worsening of symptoms

## 2023-12-11 ENCOUNTER — CARE COORDINATION (OUTPATIENT)
Dept: OTHER | Facility: CLINIC | Age: 28
End: 2023-12-11

## 2023-12-11 NOTE — CARE COORDINATION
Ambulatory Care Coordination Note    ACM attempted to reach patient for introduction to Associate Care Management related to Maternity CM/ED follow-up. HIPAA compliant message left requesting a return phone call at patient convenience. Plan for follow-up call in 7-10 days      Future Appointments   Date Time Provider [address] [place] [address]   12/18/2023  2:00 PM SCHEDULE, ENDY Brownlee, RN  Associate Care Manager   Cell: [phone]  [email or web address]

## 2024-01-02 ENCOUNTER — CARE COORDINATION (OUTPATIENT)
Dept: OTHER | Facility: CLINIC | Age: 29
End: 2024-01-02

## 2024-01-02 NOTE — CARE COORDINATION
monthly outreaches to provide telephonic support, education and resources as needed.   Discuss / follow up on: Goal progress and follow-up appointment scheduling  Summary Note: Patient reports that she is doing well. She denies any red-flag s/s or needs r/t DME, medications or appointments. She is now 12.2 weeks pregnant, due on 7/14/23. Patient's pregnancy is complicated by complicating. Patient states that she does not have new concerns or changes in her medications or history. She is not participating in [facility] but does intend to enroll after discussing with ACM. She denies any needs currently and is appreciative of the follow-up call. Social determinants of health impacting care: None. Shared Decision Making completed with patient regarding benefits of CM. She is agreeable to a follow-up call with ACM in 4 weeks and will call with any needs in the meantime.       ENDY Chavez, RN  Associate Care Manager   Cell: [phone]  [email or web address]

## 2024-01-09 DIAGNOSIS — E03.9 HYPOTHYROIDISM, UNSPECIFIED TYPE: ICD-10-CM

## 2024-01-09 RX ORDER — LEVOTHYROXINE SODIUM 0.12 MG/1
125 TABLET ORAL DAILY
Qty: 30 TABLET | Refills: 1 | OUTPATIENT
Start: 2024-01-09

## 2024-02-01 ENCOUNTER — CARE COORDINATION (OUTPATIENT)
Dept: OTHER | Facility: CLINIC | Age: 29
End: 2024-02-01

## 2024-02-01 NOTE — CARE COORDINATION
Ambulatory Care Coordination Note    ACM attempted to reach patient for  Associate Care Management related to Maternity CM follow-up. HIPAA compliant message left requesting a return phone call at patient convenience.     Plan for follow-up call in 10-14 days      Future Appointments   Date Time Provider Department Center   2/2/2024  9:45 AM Tavia Al APRN - CNM [facility]   2/29/2024  2:30 PM SCHEDULE, STEPHANY MARSHALL  [facility]   3/28/2024  3:15 PM Tavia Al APRN - CNM [facility]   4/26/2024 10:10 AM Mike Marshall MD [facility]       ENDY Chavez, RN  Associate Care Manager   Cell: [phone]  [email or web address]

## 2024-02-08 ENCOUNTER — CARE COORDINATION (OUTPATIENT)
Dept: OTHER | Facility: CLINIC | Age: 29
End: 2024-02-08

## 2024-02-08 NOTE — CARE COORDINATION
Ambulatory Care Coordination Note    ACM attempted 2nd outreach to patient for  Associate Care Management related to Maternity CM follow-up. HIPAA compliant message left requesting a return phone call at patient convenience.     Unable to Reach Letter sent to patient via [employer].    Will continue to outreach.      Future Appointments   Date Time Provider Department Center   2/29/2024  2:30 PM SCHEDULE, STEPHANY MONTOYA  [facility]   3/28/2024  3:15 PM Tavia Al APRN - [facility]   4/26/2024 10:10 AM Mike Montoya MD [facility] Ciara Chamberlain, BSN, RN  Associate Care Manager   Cell: [phone]  [email or web address]

## 2024-02-22 ENCOUNTER — CARE COORDINATION (OUTPATIENT)
Dept: OTHER | Facility: CLINIC | Age: 29
End: 2024-02-22

## 2024-05-04 ENCOUNTER — HOSPITAL ENCOUNTER (OUTPATIENT)
Age: 29
Setting detail: OBSERVATION
Discharge: HOME | End: 2024-05-04
Attending: EMERGENCY MEDICINE | Admitting: OBSTETRICS & GYNECOLOGY
Payer: COMMERCIAL

## 2024-05-04 VITALS
WEIGHT: 227 LBS | RESPIRATION RATE: 16 BRPM | DIASTOLIC BLOOD PRESSURE: 63 MMHG | BODY MASS INDEX: 35.63 KG/M2 | OXYGEN SATURATION: 98 % | TEMPERATURE: 98.7 F | HEIGHT: 67 IN | HEART RATE: 93 BPM | SYSTOLIC BLOOD PRESSURE: 110 MMHG
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
## 2024-05-04 PROBLEM — Z3A.29 29 WEEKS GESTATION OF PREGNANCY: Status: ACTIVE | Noted: 2024-05-04

## 2024-05-04 PROCEDURE — 99285 EMERGENCY DEPT VISIT HI MDM: CPT

## 2024-05-04 PROCEDURE — 59025 FETAL NON-STRESS TEST: CPT | Performed by: ADVANCED PRACTICE MIDWIFE

## 2024-05-04 PROCEDURE — 99221 1ST HOSP IP/OBS SF/LOW 40: CPT | Performed by: ADVANCED PRACTICE MIDWIFE

## 2024-05-04 ASSESSMENT — PAIN DESCRIPTION - DESCRIPTORS: DESCRIPTORS: DISCOMFORT

## 2024-05-04 ASSESSMENT — PAIN - FUNCTIONAL ASSESSMENT: PAIN_FUNCTIONAL_ASSESSMENT: 0-10

## 2024-05-04 ASSESSMENT — PAIN DESCRIPTION - PAIN TYPE: TYPE: ACUTE PAIN

## 2024-05-04 ASSESSMENT — PAIN DESCRIPTION - ONSET: ONSET: ON-GOING

## 2024-05-04 ASSESSMENT — PAIN DESCRIPTION - LOCATION: LOCATION: HEAD

## 2024-05-04 ASSESSMENT — PAIN SCALES - GENERAL: PAINLEVEL_OUTOF10: 7

## 2024-05-04 ASSESSMENT — PAIN DESCRIPTION - FREQUENCY: FREQUENCY: CONTINUOUS
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
## 2024-05-04 NOTE — PROGRESS NOTES
D/c instructions given to and reviewed with patient.   Verbalized understanding of d/c instructions.   Denies questions.   Patient ambulated off unit with family.
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
## 2024-05-04 NOTE — PROGRESS NOTES
EFM tracing reviewed by Tavia PRAJAPATI, who called dr. Kitchen on call with Dr. Marshall. Ok to discharge home and follow up as scheduled with OB. Labor precautions and discharge instructions reviewed at bedside with verbalized understanding.

## 2024-05-04 NOTE — H&P
Transportation (Medical): Not on file     Lack of Transportation (Non-Medical): No   Physical Activity: Sufficiently Active (5/19/2022)    Exercise Vital Sign     Days of Exercise per Week: 5 days     Minutes of Exercise per Session: 30 min   Stress: Not on file   Social Connections: Not on file   Intimate Partner Violence: Not At Risk (5/19/2022)    Humiliation, Afraid, Rape, and Kick questionnaire     Fear of Current or Ex-Partner: No     Emotionally Abused: No     Physically Abused: No     Sexually Abused: No   Housing Stability: Unknown (4/25/2023)    Housing Stability Vital Sign     Unable to Pay for Housing in the Last Year: Not on file     Number of Places Lived in the Last Year: Not on file     Unstable Housing in the Last Year: No     Family History:       Problem Relation Age of Onset    Obesity Mother     Other Mother         GERD    Diabetes Mother     Depression Father     No Known Problems Brother      Medications Prior to Admission:  Medications Prior to Admission: levothyroxine (SYNTHROID) 150 MCG tablet, Take 1 tablet by mouth daily  Docosahexaenoic Acid (PRENATAL DHA PO),   Cholecalciferol (VITAMIN D3) 50 MCG (2000 UT) CAPS, Take by mouth    REVIEW OF SYSTEMS:    CONSTITUTIONAL:  negative  HEENT: negative  BREAST: negative  RESPIRATORY:  negative  CARDIOVASCULAR:  negative  GASTROINTESTINAL:  negative  : negative  ALLERGIC/IMMUNOLOGIC:  negative  NEUROLOGICAL:  negative  ENDOCRINE: negative  BEHAVIOR/PSYCH:  negative    PHYSICAL EXAM:  Vitals:    05/04/24 1358 05/04/24 1408 05/04/24 1418 05/04/24 1457   BP:   115/73 110/63   Pulse:   92 93   Resp:   16 16   Temp:    98.7 °F (37.1 °C)   TempSrc:    Oral   SpO2: 97% 98% 98%    Weight:       Height:         General appearance:  awake, alert, cooperative, no apparent distress, and appears stated age  Skin: warm and dry.  No rash observed  Breast: Warm, no redness or masses felt.  No nipple discharge  Neurologic:  Awake, alert, oriented to name, place
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
## 2024-05-04 NOTE — DISCHARGE INSTRUCTIONS
Home Undelivered Discharge Instructions    After Discharge Orders:    Future Appointments   Date Time Provider Department Center   5/10/2024 10:45 AM Tavia Al APRN - CNM [facility]   5/23/2024 10:15 AM Maria Teresa Martinez APRN - CNP [facility]   6/7/2024 10:30 AM STEPHANY REYNOSO [facility]   6/7/2024 11:00 AM Marilyn Lam APRN - CNP [facility]   6/21/2024 11:10 AM Mike Marshall MD [facility]       Call physician or midwife's office on *** for instructions.              Diet:  normal diet as tolerated    Rest: normal activity as tolerated    Other instructions: Do kick counts once a day on your baby. Choose the time of day your baby is most active. Get in a comfortable lying or sitting position and time how long it takes to feel 10 kicks, twists, turns, swishes, or rolls. Call your physician or midwife if there have not been 10 kicks in 2 hours    Call physician or midwife, return to Labor and Delivery, call 911, or go to the nearest Emergency Room if: increased leakage or fluid, contractions more than  6 per  1 hour, decreased fetal movement, persistent low back pain or cramping, bleeding from vaginal area, difficulty urinating, pain with urination, difficulty breathing, new calf pain, persistent headache, or vision change

## 2024-05-15 ENCOUNTER — TELEPHONE (OUTPATIENT)
Dept: PRIMARY CARE CLINIC | Age: 29
End: 2024-05-15

## 2024-05-15 NOTE — TELEPHONE ENCOUNTER
Fadumo is wanting to know if she can be seen sooner than May 30 at 12:30 PM.  She is scheduled for a hospital F/U on that day.  She was reccommended to see a chiropractor for back pain and is 8 months pregnant.  If not able to be seen earlier, can Dr. Wheat refer her to a chiropractor.

## 2024-05-16 ENCOUNTER — OFFICE VISIT (OUTPATIENT)
Dept: PRIMARY CARE CLINIC | Age: 29
End: 2024-05-16

## 2024-05-16 VITALS
HEIGHT: 66 IN | RESPIRATION RATE: 16 BRPM | WEIGHT: 230 LBS | OXYGEN SATURATION: 98 % | BODY MASS INDEX: 36.96 KG/M2 | DIASTOLIC BLOOD PRESSURE: 50 MMHG | SYSTOLIC BLOOD PRESSURE: 100 MMHG | HEART RATE: 78 BPM

## 2024-05-16 DIAGNOSIS — Z3A.31 31 WEEKS GESTATION OF PREGNANCY: ICD-10-CM
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
## 2024-05-16 SDOH — ECONOMIC STABILITY: INCOME INSECURITY: HOW HARD IS IT FOR YOU TO PAY FOR THE VERY BASICS LIKE FOOD, HOUSING, MEDICAL CARE, AND HEATING?: NOT HARD AT ALL

## 2024-05-16 SDOH — ECONOMIC STABILITY: FOOD INSECURITY: WITHIN THE PAST 12 MONTHS, YOU WORRIED THAT YOUR FOOD WOULD RUN OUT BEFORE YOU GOT MONEY TO BUY MORE.: NEVER TRUE

## 2024-05-16 SDOH — ECONOMIC STABILITY: FOOD INSECURITY: WITHIN THE PAST 12 MONTHS, THE FOOD YOU BOUGHT JUST DIDN'T LAST AND YOU DIDN'T HAVE MONEY TO GET MORE.: NEVER TRUE

## 2024-05-16 ASSESSMENT — PATIENT HEALTH QUESTIONNAIRE - PHQ9
SUM OF ALL RESPONSES TO PHQ QUESTIONS 1-9: 0
SUM OF ALL RESPONSES TO PHQ QUESTIONS 1-9: 0
2. FEELING DOWN, DEPRESSED OR HOPELESS: NOT AT ALL
SUM OF ALL RESPONSES TO PHQ9 QUESTIONS 1 & 2: 0
1. LITTLE INTEREST OR PLEASURE IN DOING THINGS: NOT AT ALL
SUM OF ALL RESPONSES TO PHQ QUESTIONS 1-9: 0
SUM OF ALL RESPONSES TO PHQ QUESTIONS 1-9: 0
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
## 2024-07-10 ENCOUNTER — HOSPITAL ENCOUNTER (INPATIENT)
Age: 29
LOS: 2 days | Discharge: HOME | End: 2024-07-12
Attending: OBSTETRICS & GYNECOLOGY | Admitting: OBSTETRICS & GYNECOLOGY
Payer: COMMERCIAL

## 2024-07-10 ENCOUNTER — ANESTHESIA EVENT (OUTPATIENT)
Dept: LABOR AND DELIVERY | Age: 29
End: 2024-07-10
Payer: COMMERCIAL

## 2024-07-10 ENCOUNTER — APPOINTMENT (OUTPATIENT)
Dept: LABOR AND DELIVERY | Age: 29
End: 2024-07-10
Payer: COMMERCIAL

## 2024-07-10 ENCOUNTER — ANESTHESIA (OUTPATIENT)
Dept: LABOR AND DELIVERY | Age: 29
End: 2024-07-10
Payer: COMMERCIAL

## 2024-07-10 PROBLEM — Z34.93 NORMAL PREGNANCY, THIRD TRIMESTER: Status: ACTIVE | Noted: 2024-07-10

## 2024-07-10 LAB
ABO + RH BLD: NORMAL
AMPHET UR QL SCN: NEGATIVE
ARM BAND NUMBER: NORMAL
BARBITURATES UR QL SCN: NEGATIVE
BENZODIAZ UR QL: NEGATIVE
BLOOD BANK SAMPLE EXPIRATION: NORMAL
BLOOD GROUP ANTIBODIES SERPL: NEGATIVE
BUPRENORPHINE UR QL: NEGATIVE
CANNABINOIDS UR QL SCN: NEGATIVE
COCAINE UR QL SCN: NEGATIVE
ERYTHROCYTE [DISTWIDTH] IN BLOOD BY AUTOMATED COUNT: 14.4 % (ref 11.5–15)
FENTANYL UR QL: NEGATIVE
HCT VFR BLD AUTO: 36.9 % (ref 34–48)
HGB BLD-MCNC: 12 G/DL (ref 11.5–15.5)
MCH RBC QN AUTO: 28.4 PG (ref 26–35)
MCHC RBC AUTO-ENTMCNC: 32.5 G/DL (ref 32–34.5)
MCV RBC AUTO: 87.4 FL (ref 80–99.9)
METHADONE UR QL: NEGATIVE
OPIATES UR QL SCN: NEGATIVE
OXYCODONE UR QL SCN: NEGATIVE
PCP UR QL SCN: NEGATIVE
PLATELET # BLD AUTO: 190 K/UL (ref 130–450)
PMV BLD AUTO: 10.9 FL (ref 7–12)
RBC # BLD AUTO: 4.22 M/UL (ref 3.5–5.5)
TEST INFORMATION: NORMAL
WBC OTHER # BLD: 10.5 K/UL (ref 4.5–11.5)

## 2024-07-10 PROCEDURE — 3E033VJ INTRODUCTION OF OTHER HORMONE INTO PERIPHERAL VEIN, PERCUTANEOUS APPROACH: ICD-10-PCS | Performed by: OBSTETRICS & GYNECOLOGY

## 2024-07-10 PROCEDURE — 86850 RBC ANTIBODY SCREEN: CPT

## 2024-07-10 PROCEDURE — 36415 COLL VENOUS BLD VENIPUNCTURE: CPT

## 2024-07-10 PROCEDURE — 10907ZC DRAINAGE OF AMNIOTIC FLUID, THERAPEUTIC FROM PRODUCTS OF CONCEPTION, VIA NATURAL OR ARTIFICIAL OPENING: ICD-10-PCS | Performed by: OBSTETRICS & GYNECOLOGY

## 2024-07-10 PROCEDURE — 86900 BLOOD TYPING SEROLOGIC ABO: CPT

## 2024-07-10 PROCEDURE — 0KQM0ZZ REPAIR PERINEUM MUSCLE, OPEN APPROACH: ICD-10-PCS | Performed by: OBSTETRICS & GYNECOLOGY

## 2024-07-10 PROCEDURE — 7200000001 HC VAGINAL DELIVERY

## 2024-07-10 PROCEDURE — 85027 COMPLETE CBC AUTOMATED: CPT

## 2024-07-10 PROCEDURE — 86901 BLOOD TYPING SEROLOGIC RH(D): CPT

## 2024-07-10 PROCEDURE — 1220000000 HC SEMI PRIVATE OB R&B

## 2024-07-10 PROCEDURE — 2500000003 HC RX 250 WO HCPCS: Performed by: NURSE ANESTHETIST, CERTIFIED REGISTERED

## 2024-07-10 PROCEDURE — 6370000000 HC RX 637 (ALT 250 FOR IP): Performed by: OBSTETRICS & GYNECOLOGY

## 2024-07-10 PROCEDURE — 2580000003 HC RX 258: Performed by: OBSTETRICS & GYNECOLOGY

## 2024-07-10 PROCEDURE — 80307 DRUG TEST PRSMV CHEM ANLYZR: CPT

## 2024-07-10 PROCEDURE — 6360000002 HC RX W HCPCS: Performed by: OBSTETRICS & GYNECOLOGY

## 2024-07-10 RX ORDER — PENICILLIN G 3000000 [IU]/50ML
3 INJECTION, SOLUTION INTRAVENOUS EVERY 4 HOURS
Status: DISCONTINUED | OUTPATIENT
Start: 2024-07-10 | End: 2024-07-10

## 2024-07-10 RX ORDER — NALOXONE HYDROCHLORIDE 0.4 MG/ML
INJECTION, SOLUTION INTRAMUSCULAR; INTRAVENOUS; SUBCUTANEOUS PRN
Status: DISCONTINUED | OUTPATIENT
Start: 2024-07-10 | End: 2024-07-10

## 2024-07-10 RX ORDER — MISOPROSTOL 200 UG/1
800 TABLET ORAL PRN
Status: DISCONTINUED | OUTPATIENT
Start: 2024-07-10 | End: 2024-07-12 | Stop reason: HOSPADM

## 2024-07-10 RX ORDER — SODIUM CHLORIDE, SODIUM LACTATE, POTASSIUM CHLORIDE, AND CALCIUM CHLORIDE .6; .31; .03; .02 G/100ML; G/100ML; G/100ML; G/100ML
500 INJECTION, SOLUTION INTRAVENOUS PRN
Status: DISCONTINUED | OUTPATIENT
Start: 2024-07-10 | End: 2024-07-10

## 2024-07-10 RX ORDER — CARBOPROST TROMETHAMINE 250 UG/ML
250 INJECTION, SOLUTION INTRAMUSCULAR PRN
Status: DISCONTINUED | OUTPATIENT
Start: 2024-07-10 | End: 2024-07-12 | Stop reason: HOSPADM

## 2024-07-10 RX ORDER — ONDANSETRON 2 MG/ML
4 INJECTION INTRAMUSCULAR; INTRAVENOUS EVERY 6 HOURS PRN
Status: DISCONTINUED | OUTPATIENT
Start: 2024-07-10 | End: 2024-07-10

## 2024-07-10 RX ORDER — MODIFIED LANOLIN
OINTMENT (GRAM) TOPICAL PRN
Status: DISCONTINUED | OUTPATIENT
Start: 2024-07-10 | End: 2024-07-12 | Stop reason: HOSPADM

## 2024-07-10 RX ORDER — SODIUM CHLORIDE 0.9 % (FLUSH) 0.9 %
5-40 SYRINGE (ML) INJECTION EVERY 12 HOURS SCHEDULED
Status: DISCONTINUED | OUTPATIENT
Start: 2024-07-10 | End: 2024-07-12 | Stop reason: HOSPADM

## 2024-07-10 RX ORDER — TRANEXAMIC ACID 10 MG/ML
1000 INJECTION, SOLUTION INTRAVENOUS
Status: ACTIVE | OUTPATIENT
Start: 2024-07-10 | End: 2024-07-11

## 2024-07-10 RX ORDER — ACETAMINOPHEN 500 MG
1000 TABLET ORAL EVERY 6 HOURS PRN
Status: DISCONTINUED | OUTPATIENT
Start: 2024-07-10 | End: 2024-07-12 | Stop reason: HOSPADM

## 2024-07-10 RX ORDER — METHYLERGONOVINE MALEATE 0.2 MG/ML
200 INJECTION INTRAVENOUS PRN
Status: DISCONTINUED | OUTPATIENT
Start: 2024-07-10 | End: 2024-07-12 | Stop reason: HOSPADM

## 2024-07-10 RX ORDER — SODIUM CHLORIDE 9 MG/ML
25 INJECTION, SOLUTION INTRAVENOUS PRN
Status: DISCONTINUED | OUTPATIENT
Start: 2024-07-10 | End: 2024-07-10

## 2024-07-10 RX ORDER — SODIUM CHLORIDE 0.9 % (FLUSH) 0.9 %
5-40 SYRINGE (ML) INJECTION EVERY 12 HOURS SCHEDULED
Status: DISCONTINUED | OUTPATIENT
Start: 2024-07-10 | End: 2024-07-10

## 2024-07-10 RX ORDER — ONDANSETRON 4 MG/1
4 TABLET, ORALLY DISINTEGRATING ORAL EVERY 6 HOURS PRN
Status: DISCONTINUED | OUTPATIENT
Start: 2024-07-10 | End: 2024-07-12 | Stop reason: HOSPADM

## 2024-07-10 RX ORDER — SODIUM CHLORIDE, SODIUM LACTATE, POTASSIUM CHLORIDE, CALCIUM CHLORIDE 600; 310; 30; 20 MG/100ML; MG/100ML; MG/100ML; MG/100ML
INJECTION, SOLUTION INTRAVENOUS CONTINUOUS
Status: DISCONTINUED | OUTPATIENT
Start: 2024-07-10 | End: 2024-07-10

## 2024-07-10 RX ORDER — MISOPROSTOL 200 UG/1
400 TABLET ORAL PRN
Status: DISCONTINUED | OUTPATIENT
Start: 2024-07-10 | End: 2024-07-12 | Stop reason: HOSPADM

## 2024-07-10 RX ORDER — ONDANSETRON 2 MG/ML
4 INJECTION INTRAMUSCULAR; INTRAVENOUS EVERY 6 HOURS PRN
Status: DISCONTINUED | OUTPATIENT
Start: 2024-07-10 | End: 2024-07-12 | Stop reason: HOSPADM

## 2024-07-10 RX ORDER — LIDOCAINE HYDROCHLORIDE 10 MG/ML
INJECTION, SOLUTION INFILTRATION; PERINEURAL
Status: DISCONTINUED
Start: 2024-07-10 | End: 2024-07-10 | Stop reason: WASHOUT

## 2024-07-10 RX ORDER — LIDOCAINE HYDROCHLORIDE AND EPINEPHRINE 15; 5 MG/ML; UG/ML
INJECTION, SOLUTION EPIDURAL PRN
Status: DISCONTINUED | OUTPATIENT
Start: 2024-07-10 | End: 2024-07-10 | Stop reason: SDUPTHER

## 2024-07-10 RX ORDER — LEVOTHYROXINE SODIUM 0.07 MG/1
150 TABLET ORAL
Status: DISCONTINUED | OUTPATIENT
Start: 2024-07-11 | End: 2024-07-12 | Stop reason: HOSPADM

## 2024-07-10 RX ORDER — SODIUM CHLORIDE 9 MG/ML
INJECTION, SOLUTION INTRAVENOUS PRN
Status: DISCONTINUED | OUTPATIENT
Start: 2024-07-10 | End: 2024-07-12 | Stop reason: HOSPADM

## 2024-07-10 RX ORDER — PRENATAL WITH FERROUS FUM AND FOLIC ACID 3080; 920; 120; 400; 22; 1.84; 3; 20; 10; 1; 12; 200; 27; 25; 2 [IU]/1; [IU]/1; MG/1; [IU]/1; MG/1; MG/1; MG/1; MG/1; MG/1; MG/1; UG/1; MG/1; MG/1; MG/1; MG/1
1 TABLET ORAL
Status: DISCONTINUED | OUTPATIENT
Start: 2024-07-11 | End: 2024-07-12 | Stop reason: HOSPADM

## 2024-07-10 RX ORDER — ACETAMINOPHEN 650 MG
TABLET, EXTENDED RELEASE ORAL
Status: COMPLETED
Start: 2024-07-10 | End: 2024-07-10

## 2024-07-10 RX ORDER — SODIUM CHLORIDE 0.9 % (FLUSH) 0.9 %
5-40 SYRINGE (ML) INJECTION PRN
Status: DISCONTINUED | OUTPATIENT
Start: 2024-07-10 | End: 2024-07-12 | Stop reason: HOSPADM

## 2024-07-10 RX ORDER — IBUPROFEN 600 MG/1
600 TABLET ORAL EVERY 6 HOURS PRN
Status: DISCONTINUED | OUTPATIENT
Start: 2024-07-10 | End: 2024-07-12 | Stop reason: HOSPADM

## 2024-07-10 RX ORDER — SODIUM CHLORIDE 0.9 % (FLUSH) 0.9 %
5-40 SYRINGE (ML) INJECTION PRN
Status: DISCONTINUED | OUTPATIENT
Start: 2024-07-10 | End: 2024-07-10

## 2024-07-10 RX ORDER — DOCUSATE SODIUM 100 MG/1
100 CAPSULE, LIQUID FILLED ORAL 2 TIMES DAILY
Status: DISCONTINUED | OUTPATIENT
Start: 2024-07-10 | End: 2024-07-12 | Stop reason: HOSPADM

## 2024-07-10 RX ADMIN — PENICILLIN G 3 MILLION UNITS: 3000000 INJECTION, SOLUTION INTRAVENOUS at 14:06

## 2024-07-10 RX ADMIN — Medication: at 23:54

## 2024-07-10 RX ADMIN — PENICILLIN G 3 MILLION UNITS: 3000000 INJECTION, SOLUTION INTRAVENOUS at 18:33

## 2024-07-10 RX ADMIN — DOCUSATE SODIUM 100 MG: 100 CAPSULE, LIQUID FILLED ORAL at 23:54

## 2024-07-10 RX ADMIN — Medication 1 MILLI-UNITS/MIN: at 10:07

## 2024-07-10 RX ADMIN — IBUPROFEN 600 MG: 600 TABLET, FILM COATED ORAL at 23:54

## 2024-07-10 RX ADMIN — SODIUM CHLORIDE, POTASSIUM CHLORIDE, SODIUM LACTATE AND CALCIUM CHLORIDE: 600; 310; 30; 20 INJECTION, SOLUTION INTRAVENOUS at 09:43

## 2024-07-10 RX ADMIN — LIDOCAINE HYDROCHLORIDE,EPINEPHRINE BITARTRATE 1 ML: 15; .005 INJECTION, SOLUTION EPIDURAL; INFILTRATION; INTRACAUDAL; PERINEURAL at 17:10

## 2024-07-10 RX ADMIN — DEXTROSE MONOHYDRATE 5 MILLION UNITS: 50 INJECTION, SOLUTION INTRAVENOUS at 09:43

## 2024-07-10 RX ADMIN — Medication: at 20:56

## 2024-07-10 ASSESSMENT — PAIN DESCRIPTION - DESCRIPTORS: DESCRIPTORS: DISCOMFORT;SORE;TENDER

## 2024-07-10 ASSESSMENT — PAIN SCALES - GENERAL: PAINLEVEL_OUTOF10: 4

## 2024-07-10 ASSESSMENT — PAIN DESCRIPTION - ORIENTATION: ORIENTATION: LOWER

## 2024-07-10 ASSESSMENT — PAIN DESCRIPTION - LOCATION: LOCATION: ABDOMEN

## 2024-07-10 ASSESSMENT — PAIN - FUNCTIONAL ASSESSMENT: PAIN_FUNCTIONAL_ASSESSMENT: ACTIVITIES ARE NOT PREVENTED

## 2024-07-10 NOTE — ANESTHESIA PROCEDURE NOTES
CSE Block    Patient location during procedure: OB  Reason for block: procedure for pain  Staffing  Performed: resident/CRNA   Anesthesiologist: Susan Garcia MD  Resident/CRNA: Ana Schaefer APRN - CRNA  Performed by: Ana Schaefer APRN - CRNA  Authorized by: Susan Garcia MD    CSE  Patient position: sitting  Prep: ChloraPrep  Patient monitoring: continuous pulse ox and frequent blood pressure checks  Approach: midline  Provider prep: mask and sterile gloves  Spinal Needle  Needle type: pencil-tip   Needle gauge: 27 G  Needle length: 5 inch.  Epidural Needle  Injection technique: MARGOT air  Needle type: Tuohy   Needle gauge: 18 G  Needle length: 3.5 in  Needle insertion depth: 6 cm  Location: lumbar (1-5)  Catheter  Catheter type: end hole  Catheter size: 20g.  Catheter at skin depth: 15 cm  Test dose: negative  Assessment  Hemodynamics: stable  Preanesthetic Checklist  Completed: patient identified, IV checked, site marked, risks and benefits discussed, surgical/procedural consents, equipment checked, pre-op evaluation, timeout performed, anesthesia consent given, oxygen available and monitors applied/VS acknowledged

## 2024-07-10 NOTE — PROGRESS NOTES
Updated Dr. Marshall on the phone in regards to patient SVE and epidural placement. Continue with POC.

## 2024-07-10 NOTE — PROGRESS NOTES
1703- Patient off monitor for efm placement.  1715-Placed back on efm post epidural placement. RN remains at bedside.

## 2024-07-10 NOTE — ANESTHESIA PRE PROCEDURE
Department of Anesthesiology  Preprocedure Note       Name:  Fadumo Wheeler   Age:  28 y.o.  :  1995                                          MRN:  [id number]         Date:  7/10/2024      Surgeon: * No surgeons listed *    Procedure: * No procedures listed *    Medications prior to admission:   Prior to Admission medications    Medication Sig Start Date End Date Taking? Authorizing Provider   levothyroxine (SYNTHROID) 150 MCG tablet Take 1 tablet by mouth daily 3/11/24   Tavia Al APRN - CNM   Docosahexaenoic Acid (PRENATAL DHA PO)     ProviderBravo MD   Cholecalciferol (VITAMIN D3) 50 MCG (2000) CAPS Take by mouth    ProviderBravo MD       Current medications:    Current Facility-Administered Medications   Medication Dose Route Frequency Provider Last Rate Last Admin    lactated ringers IV soln infusion   IntraVENous Continuous Mike Marshall  mL/hr at 07/10/24 0943 New Bag at 07/10/24 0943    lactated ringers bolus 500 mL  500 mL IntraVENous PRN Mike Marshall MD        Or    lactated ringers bolus 500 mL  500 mL IntraVENous PRN Mike Marshall MD        sodium chloride flush 0.9 % injection 5-40 mL  5-40 mL IntraVENous 2 times per day Mike Marshall MD        sodium chloride flush 0.9 % injection 5-40 mL  5-40 mL IntraVENous PRN Mike Marshall MD        0.9 % sodium chloride infusion  25 mL IntraVENous PRN Mike Marshall MD        penicillin G potassium IVPB 3 Million Units  3 Million Units IntraVENous Q4H Mike Marshall MD        oxytocin (PITOCIN) 15 units in 250 mL infusion  1-20 arnulfo-units/min IntraVENous Continuous Mike Marshall MD 1 mL/hr at 07/10/24 1007 1 arnulfo-units/min at 07/10/24 1007       Allergies:    Allergies   Allergen Reactions    Tuberculin, Ppd Other (See Comments)       Problem List:    Patient Active Problem List   Diagnosis Code    Hypothyroidism E03.9    Obesity (BMI 30-39.9) E66.9

## 2024-07-10 NOTE — PROGRESS NOTES
Spoke on the phone with Dr. Marshall in regards to patient SVE. Ordered to have patient AROM, shut off pitocin and only restart pitocin if ctx begin to space apart. House officer notified.

## 2024-07-11 PROCEDURE — 6370000000 HC RX 637 (ALT 250 FOR IP): Performed by: OBSTETRICS & GYNECOLOGY

## 2024-07-11 PROCEDURE — 1220000000 HC SEMI PRIVATE OB R&B

## 2024-07-11 PROCEDURE — 2580000003 HC RX 258: Performed by: OBSTETRICS & GYNECOLOGY

## 2024-07-11 RX ADMIN — IBUPROFEN 600 MG: 600 TABLET, FILM COATED ORAL at 09:53

## 2024-07-11 RX ADMIN — LEVOTHYROXINE SODIUM 150 MCG: 75 TABLET ORAL at 06:54

## 2024-07-11 RX ADMIN — DOCUSATE SODIUM 100 MG: 100 CAPSULE, LIQUID FILLED ORAL at 20:10

## 2024-07-11 RX ADMIN — Medication: at 20:10

## 2024-07-11 RX ADMIN — DOCUSATE SODIUM 100 MG: 100 CAPSULE, LIQUID FILLED ORAL at 09:53

## 2024-07-11 RX ADMIN — ACETAMINOPHEN 1000 MG: 500 TABLET ORAL at 06:54

## 2024-07-11 RX ADMIN — SODIUM CHLORIDE, PRESERVATIVE FREE 10 ML: 5 INJECTION INTRAVENOUS at 09:56

## 2024-07-11 RX ADMIN — IBUPROFEN 600 MG: 600 TABLET, FILM COATED ORAL at 16:38

## 2024-07-11 RX ADMIN — PRENATAL WITH FERROUS FUM AND FOLIC ACID 1 TABLET: 3080; 920; 120; 400; 22; 1.84; 3; 20; 10; 1; 12; 200; 27; 25; 2 TABLET ORAL at 09:53

## 2024-07-11 RX ADMIN — ACETAMINOPHEN 1000 MG: 500 TABLET ORAL at 20:10

## 2024-07-11 RX ADMIN — ACETAMINOPHEN 1000 MG: 500 TABLET ORAL at 14:04

## 2024-07-11 ASSESSMENT — PAIN DESCRIPTION - ORIENTATION
ORIENTATION: LOWER
ORIENTATION: RIGHT;LEFT
ORIENTATION: LOWER
ORIENTATION: LOWER

## 2024-07-11 ASSESSMENT — PAIN SCALES - GENERAL
PAINLEVEL_OUTOF10: 4
PAINLEVEL_OUTOF10: 5
PAINLEVEL_OUTOF10: 2
PAINLEVEL_OUTOF10: 6
PAINLEVEL_OUTOF10: 5
PAINLEVEL_OUTOF10: 5

## 2024-07-11 ASSESSMENT — PAIN DESCRIPTION - DESCRIPTORS
DESCRIPTORS: ACHING;CRAMPING;DISCOMFORT
DESCRIPTORS: ACHING;DISCOMFORT;CRAMPING
DESCRIPTORS: CRAMPING
DESCRIPTORS: ACHING;CRAMPING;DISCOMFORT

## 2024-07-11 ASSESSMENT — PAIN DESCRIPTION - LOCATION
LOCATION: ABDOMEN

## 2024-07-11 ASSESSMENT — PAIN - FUNCTIONAL ASSESSMENT
PAIN_FUNCTIONAL_ASSESSMENT: ACTIVITIES ARE NOT PREVENTED

## 2024-07-11 NOTE — PROGRESS NOTES
Hearing screening results were discussed with parent. Questions answered. Brochure given to parent. Advised to monitor developmental milestones and contact physician for any concerns.   Electronically signed by Dae Murrieta on 7/11/2024 at 10:07 AM

## 2024-07-11 NOTE — PROGRESS NOTES
Pt feeling pressure and pain hyperventilating. Pt 8cm -1 gave epidural bolus. Called dr mcguire and verified he is here

## 2024-07-11 NOTE — LACTATION NOTE
Second time mom provided breast milk for six months for her first baby.  Mom stated this baby is sleepy and isn't interested in nursing.  Baby is being circ'd now so mom will call me to assist with a feeding. Discussed frequency and duration of breastfeeds and signs of adequate milk transfer. Encouraged mom to hand express drops of colostrum at the start of a  breastfeed.  Recommended to avoid a pacifier for three weeks or until breastfeeding is well established.  Mom has an electric breast pump for home.  Went over breastfeeding resources.  Encouraged mom to call us with questions or concerns or for assistance.

## 2024-07-11 NOTE — PROGRESS NOTES
Pt complete, pt hyperventilating, trying to slow breathing. Called dr mcguire to come , trying to adjust ultrasound. Broken tracing

## 2024-07-11 NOTE — PLAN OF CARE
Problem: Postpartum  Goal: Experiences normal postpartum course  Description:  Postpartum OB-Pregnancy care plan goal which identifies if the mother is experiencing a normal postpartum course  2024 by Che Harris RN  Outcome: Progressing     Problem: Postpartum  Goal: Appropriate maternal -  bonding  Description:  Postpartum OB-Pregnancy care plan goal which identifies if the mother and  are bonding appropriately  2024 by Che Harris RN  Outcome: Progressing     Problem: Postpartum  Goal: Establishment of infant feeding pattern  Description:  Postpartum OB-Pregnancy care plan goal which identifies if the mother is establishing a feeding pattern with their   2024 by Che Harris RN  Outcome: Progressing     Problem: Postpartum  Goal: Incisions, wounds, or drain sites healing without S/S of infection  2024 by Che Harris RN  Outcome: Progressing     Problem: Pain  Goal: Verbalizes/displays adequate comfort level or baseline comfort level  2024 by Che Harris RN  Outcome: Progressing     Problem: Infection - Adult  Goal: Absence of infection at discharge  2024 by Che Harris RN  Outcome: Progressing     Problem: Infection - Adult  Goal: Absence of infection during hospitalization  2024 by Che Harris RN  Outcome: Progressing     Problem: Infection - Adult  Goal: Absence of fever/infection during anticipated neutropenic period  2024 by Che Harris RN  Outcome: Progressing     Problem: Safety - Adult  Goal: Free from fall injury  2024 by Che Harris RN  Outcome: Progressing     Problem: Discharge Planning  Goal: Discharge to home or other facility with appropriate resources  2024 by Che Harris RN  Outcome: Progressing     Problem: Chronic Conditions and Co-morbidities  Goal: Patient's chronic conditions and co-morbidity symptoms are monitored and  maintained or improved  7/11/2024 0025 by Che Harris, RN  Outcome: Progressing

## 2024-07-11 NOTE — PROGRESS NOTES
Pt moved to room 302 pt voided and pericare was done applied new pads. Call light in reach report to staff

## 2024-07-11 NOTE — ANESTHESIA POSTPROCEDURE EVALUATION
Department of Anesthesiology  Postprocedure Note    Patient: Fadumo Wheeler  MRN: [id number]  YOB: 1995  Date of evaluation: 7/11/2024    Procedure Summary       Date: 07/10/24 Room / Location:     Anesthesia Start: 1705 Anesthesia Stop: 2058    Procedure: Labor Analgesia Diagnosis:     Scheduled Providers:  Responsible Provider: Susan Garcia MD    Anesthesia Type: CSE, epidural ASA Status: 3            Anesthesia Type: No value filed.    Thao Phase I: Thao Score: 10    Thao Phase II:      Anesthesia Post Evaluation    Patient location during evaluation: bedside  Patient participation: complete - patient participated  Level of consciousness: awake and alert  Pain score: 0  Airway patency: patent  Nausea & Vomiting: no nausea and no vomiting  Cardiovascular status: hemodynamically stable  Respiratory status: acceptable  Hydration status: euvolemic  Comments: Patient satisfied with epidural for labor  Pain management: adequate        No notable events documented.

## 2024-07-11 NOTE — L&D DELIVERY NOTE
Department of Obstetrics and Gynecology  Spontaneous Vaginal Delivery Note    Patient:  Fadumo Wheeler     Admit Date:  7/10/2024  6:14 AM  Medical Record Number:  [id number]   Procedure Date: 7/10/2024 10:35 PM     Pre-delivery Diagnosis: Multigravida IUP at 39 weeks 3 days for labor induction (BS = 7-8), GBS colonized, hypothyroidism, obesity (pregravid BMI 34.56) history of prior macrosomic delivery with shoulder dystocia (9 pounds reduced with suprapubic pressure and Manuel, < 20 sec)  Patient Active Problem List   Diagnosis    Hypothyroidism    Adult BMI 34.0-34.9 kg/sq m (pregravid BMI 34.56)    39 3/7 weeks gestation of pregnancy    Encounter for induction of labor    Hypothyroidism in pregnancy, third trimester    Obesity affecting pregnancy in third trimester    GBS (group B Streptococcus carrier), +RV culture, currently pregnant    History of delivery of macrosomal infant (9#) (US at 35w3d 48.6%)    History of shoulder dystocia in prior pregnancy, currently pregnant in third trimester (<20 sec resolved w suprapubic pressure and Manuel)     Post-delivery Diagnosis:  Living  infant Female, second-degree perineal laceration, right periurethral laceration  Patient Active Problem List   Diagnosis    Hypothyroidism    Adult BMI 34.0-34.9 kg/sq m (pregravid BMI 34.56)    39 3/7 weeks gestation of pregnancy    Encounter for induction of labor    Hypothyroidism in pregnancy, third trimester    Obesity affecting pregnancy in third trimester    GBS (group B Streptococcus carrier), +RV culture, currently pregnant    History of delivery of macrosomal infant (9#) (US at 35w3d 48.6%)    History of shoulder dystocia in prior pregnancy, currently pregnant in third trimester (<20 sec resolved w suprapubic pressure and Manuel)     (normal spontaneous vaginal delivery)    Term birth of  male    Second degree perineal laceration, delivered, current hospitalization    Periurethral laceration (right),  #[id number]     Start of Mother's Information      Delivery Blood Loss  07/10/24 1539 - 24      Quantitative Blood Loss (mL) Hospital Encounter 300 grams    Total  300 mL     End of Mother's Information  Mother: Fadumo Wheeler #[id number]      Delivery Providers    Delivering clinician: Mike Marshall MD     Provider Role    Mike Marshall MD Obstetrician    Cecile Partida RN Primary Nurse    Jennifer Ledezma RN Nursery Nurse          Assessment    Living Status: Living        Skin Color:   Heart Rate:   Reflex Irritability:   Muscle Tone:   Respiratory Effort:   Total:            1 Minute:    1    2    2    2    2    9         5 Minute:    1    2    2    2    2    9                                        Apgars Assigned By: VERNON         Resuscitation    Method: Bulb Suction, Stimulation        Germfask Measurements      Birth Weight: 3600 g   Birth Length: 53.3 cm     Head Circumference: 35 cm         Skin to Skin      Skin to Skin Initiation Date/Time: 7/10/24 20:59:00 EDT     Skin to Skin With: Mother        Specimen: None.     Estimated blood loss: 300 mL    Condition:  infant stable to general nursery and mother stable to maternity    Blood Type and Rh: O POSITIVE    Rubella Immunity Status:   Immune           Infant Feeding:    breast    Attending Attestation: I performed the procedure.    Mike Marshall MD MD, FACOG  7/10/2024 10:35 PM

## 2024-07-12 VITALS
OXYGEN SATURATION: 98 % | DIASTOLIC BLOOD PRESSURE: 52 MMHG | SYSTOLIC BLOOD PRESSURE: 95 MMHG | HEART RATE: 62 BPM | RESPIRATION RATE: 16 BRPM | TEMPERATURE: 97.7 F

## 2024-07-12 PROBLEM — Z3A.39 39 WEEKS GESTATION OF PREGNANCY: Status: ACTIVE | Noted: 2024-05-04

## 2024-07-12 PROBLEM — Z87.59 HISTORY OF DELIVERY OF MACROSOMAL INFANT: Status: RESOLVED | Noted: 2024-07-12 | Resolved: 2024-07-12

## 2024-07-12 PROBLEM — O09.293 HISTORY OF SHOULDER DYSTOCIA IN PRIOR PREGNANCY, CURRENTLY PREGNANT, THIRD TRIMESTER: Status: RESOLVED | Noted: 2024-07-12 | Resolved: 2024-07-12

## 2024-07-12 PROBLEM — O99.283 HYPOTHYROIDISM IN PREGNANCY, THIRD TRIMESTER: Status: ACTIVE | Noted: 2024-07-12

## 2024-07-12 PROBLEM — Z34.90 ENCOUNTER FOR INDUCTION OF LABOR: Status: RESOLVED | Noted: 2024-07-10 | Resolved: 2024-07-12

## 2024-07-12 PROBLEM — O99.213 OBESITY AFFECTING PREGNANCY IN THIRD TRIMESTER: Status: RESOLVED | Noted: 2024-07-12 | Resolved: 2024-07-12

## 2024-07-12 PROBLEM — O99.820 GBS (GROUP B STREPTOCOCCUS CARRIER), +RV CULTURE, CURRENTLY PREGNANT: Status: RESOLVED | Noted: 2024-07-10 | Resolved: 2024-07-12

## 2024-07-12 PROBLEM — E03.9 HYPOTHYROIDISM IN PREGNANCY, THIRD TRIMESTER: Status: ACTIVE | Noted: 2024-07-12

## 2024-07-12 PROBLEM — O09.293 HISTORY OF SHOULDER DYSTOCIA IN PRIOR PREGNANCY, CURRENTLY PREGNANT, THIRD TRIMESTER: Status: ACTIVE | Noted: 2024-07-12

## 2024-07-12 PROBLEM — O99.213 OBESITY AFFECTING PREGNANCY IN THIRD TRIMESTER: Status: ACTIVE | Noted: 2024-07-12

## 2024-07-12 PROBLEM — O99.820 GBS (GROUP B STREPTOCOCCUS CARRIER), +RV CULTURE, CURRENTLY PREGNANT: Status: ACTIVE | Noted: 2024-07-10

## 2024-07-12 PROBLEM — Z34.90 ENCOUNTER FOR INDUCTION OF LABOR: Status: ACTIVE | Noted: 2024-07-10

## 2024-07-12 PROBLEM — O99.283 HYPOTHYROIDISM IN PREGNANCY, THIRD TRIMESTER: Status: RESOLVED | Noted: 2024-07-12 | Resolved: 2024-07-12

## 2024-07-12 PROBLEM — Z3A.39 39 WEEKS GESTATION OF PREGNANCY: Status: RESOLVED | Noted: 2024-07-12 | Resolved: 2024-07-12

## 2024-07-12 PROBLEM — Z3A.39 39 WEEKS GESTATION OF PREGNANCY: Status: ACTIVE | Noted: 2024-07-12

## 2024-07-12 PROBLEM — Z87.59 HISTORY OF DELIVERY OF MACROSOMAL INFANT: Status: ACTIVE | Noted: 2024-07-12

## 2024-07-12 PROBLEM — E03.9 HYPOTHYROIDISM IN PREGNANCY, THIRD TRIMESTER: Status: RESOLVED | Noted: 2024-07-12 | Resolved: 2024-07-12

## 2024-07-12 PROCEDURE — 6370000000 HC RX 637 (ALT 250 FOR IP): Performed by: OBSTETRICS & GYNECOLOGY

## 2024-07-12 RX ORDER — IBUPROFEN 600 MG/1
600 TABLET ORAL EVERY 6 HOURS PRN
Qty: 60 TABLET | Refills: 1 | Status: SHIPPED | OUTPATIENT
Start: 2024-07-12

## 2024-07-12 RX ORDER — ACETAMINOPHEN 500 MG
1000 TABLET ORAL EVERY 6 HOURS PRN
Refills: 0 | COMMUNITY
Start: 2024-07-12

## 2024-07-12 RX ORDER — MODIFIED LANOLIN
1 OINTMENT (GRAM) TOPICAL PRN
COMMUNITY
Start: 2024-07-12

## 2024-07-12 RX ORDER — PSEUDOEPHEDRINE HCL 30 MG
100 TABLET ORAL 2 TIMES DAILY PRN
COMMUNITY
Start: 2024-07-12

## 2024-07-12 RX ADMIN — IBUPROFEN 600 MG: 600 TABLET, FILM COATED ORAL at 00:02

## 2024-07-12 RX ADMIN — IBUPROFEN 600 MG: 600 TABLET, FILM COATED ORAL at 06:28

## 2024-07-12 RX ADMIN — ACETAMINOPHEN 1000 MG: 500 TABLET ORAL at 03:34

## 2024-07-12 RX ADMIN — ACETAMINOPHEN 1000 MG: 500 TABLET ORAL at 10:42

## 2024-07-12 RX ADMIN — PRENATAL WITH FERROUS FUM AND FOLIC ACID 1 TABLET: 3080; 920; 120; 400; 22; 1.84; 3; 20; 10; 1; 12; 200; 27; 25; 2 TABLET ORAL at 08:36

## 2024-07-12 RX ADMIN — LEVOTHYROXINE SODIUM 150 MCG: 75 TABLET ORAL at 06:28

## 2024-07-12 ASSESSMENT — PAIN DESCRIPTION - DESCRIPTORS: DESCRIPTORS: DISCOMFORT;ACHING

## 2024-07-12 ASSESSMENT — PAIN DESCRIPTION - LOCATION: LOCATION: BACK

## 2024-07-12 ASSESSMENT — PAIN DESCRIPTION - ORIENTATION: ORIENTATION: MID

## 2024-07-12 ASSESSMENT — PAIN SCALES - GENERAL
PAINLEVEL_OUTOF10: 5
PAINLEVEL_OUTOF10: 3

## 2024-07-12 ASSESSMENT — PAIN - FUNCTIONAL ASSESSMENT: PAIN_FUNCTIONAL_ASSESSMENT: ACTIVITIES ARE NOT PREVENTED

## 2024-07-12 NOTE — DISCHARGE SUMMARY
Department of Obstetrics and Gynecology  Labor and Delivery  Discharge Summary    Patient:  Fadumo Wheeler         Medical Record Number:  [id number]    Admit Date:  7/10/2024  6:14 AM    Discharge Date: 2024    Final Diagnosis:   Principal Problem (Resolved):    Encounter for induction of labor  Active Problems:     (normal spontaneous vaginal delivery)    Term birth of  male    Second degree perineal laceration, delivered, current hospitalization    Periurethral laceration (right), delivered, current hospitalization    Hypothyroidism    History of delivery of macrosomal infant (9#) (US at 35w3d 48.6%)  Resolved Problems:    39 3/7 weeks gestation of pregnancy    GBS (group B Streptococcus carrier), +RV culture, currently pregnant    Hypothyroidism in pregnancy, third trimester    Obesity affecting pregnancy in third trimester    History of shoulder dystocia in prior pregnancy, currently pregnant in third trimester (<20 sec resolved w suprapubic pressure and Manuel)    Chief Complaint - Presenting Illness - Physical Findings:   Normal pregnancy, third trimester [Z34.93]  HPI: The patient is a 28 y.o. W female  at 39w3d with a pregnancy complicated by GBS colonization, obesity (pregravid BMI 34.56), hypothyroidism and a history of a delivery of a macrosomic infant (9 pounds) with shoulder dystocia (reduced by suprapubic pressure and Manuel, < 20sec) who presents with for a scheduled Pitocin labor induction 2 to 3 cm dilated with a BS = 7-8.  On presentation she denies regular contractions, leaking fluid and vaginal bleeding.  She has no symptoms of UTI or PIH.  There has been good fetal movement.     Hospital Course:   Delivery Summary:   Procedure Date: 7/10/2024 10:35 PM      Pre-delivery Diagnosis: Multigravida IUP at 39 weeks 3 days for labor induction (BS = 7-8), GBS colonized, hypothyroidism, obesity (pregravid BMI 34.56) history of prior macrosomic delivery with shoulder dystocia

## 2024-07-12 NOTE — PROGRESS NOTES
PPD #1    Patient:  Fadumo Wheeler     Admit Date:  7/10/2024  6:14 AM  Medical Record Number:  [id number]   Today's Date: 7/11/2024    S: No complaints, doing well; tolerating diet: yes -general; ambulating well: yes -up in room; voiding without difficulty:  yes -has no urinary complaints; bm: denies; flatus: yes -normal; pain meds appropriate: yes -Tylenol and ibuprofen; vaginal bleeding: Less than a period.    O:   Vitals:    07/10/24 2340 07/11/24 0300 07/11/24 0648 07/11/24 1717   BP: (!) 121/58 (!) 108/53 (!) 112/53 (!) 113/58   Pulse: 80 80 78 71   Resp: 16 16 16 16   Temp: 97.7 °F (36.5 °C) 97.7 °F (36.5 °C) 98.4 °F (36.9 °C) 97.7 °F (36.5 °C)   TempSrc: Temporal Temporal Temporal Temporal   SpO2: 96% 97% 97% 99%     Gen: A&O, cooperative, pleasant   Heart: RRR   Lungs: CTA b/l   Abd; soft, NT, non distended, +BS  Back: no CVA or paraspinal tenderness   Ext: No clubbing, cyanosis, edema:no , no cords palpable, no calf tenderness   Neuro: intact   Uterus: firm, well contracted, nt   Perineum: intact, healing well   Anu pad: staining only, thin lochia    Lab Results   Component Value Date    HGB 12.0 07/10/2024    HCT 36.9 07/10/2024      Recent Results (from the past 72 hour(s))   CBC    Collection Time: 07/10/24  9:05 AM   Result Value Ref Range    WBC 10.5 4.5 - 11.5 k/uL    RBC 4.22 3.50 - 5.50 m/uL    Hemoglobin 12.0 11.5 - 15.5 g/dL    Hematocrit 36.9 34.0 - 48.0 %    MCV 87.4 80.0 - 99.9 fL    MCH 28.4 26.0 - 35.0 pg    MCHC 32.5 32.0 - 34.5 g/dL    RDW 14.4 11.5 - 15.0 %    Platelets 190 130 - 450 k/uL    MPV 10.9 7.0 - 12.0 fL   TYPE AND SCREEN    Collection Time: 07/10/24  9:05 AM   Result Value Ref Range    Blood Bank Sample Expiration 07/13/2024,2359     Arm Band Number [id number]     ABO/Rh O POSITIVE     Antibody Screen NEGATIVE    Drug Screen Multi Urine With Bup    Collection Time: 07/10/24  9:13 AM   Result Value Ref Range    Amphetamine Screen, Ur NEGATIVE NEGATIVE    Barbiturate Screen, Ur  NEGATIVE NEGATIVE    Cocaine Metabolite, Urine NEGATIVE NEGATIVE    Benzodiazepine Screen, Urine NEGATIVE NEGATIVE    Buprenorphine Urine NEGATIVE NEGATIVE    Methadone Screen, Urine NEGATIVE NEGATIVE    Opiates, Urine NEGATIVE NEGATIVE    Phencyclidine, Urine NEGATIVE NEGATIVE    Cannabinoid Scrn, Ur NEGATIVE NEGATIVE    Fentanyl, Ur NEGATIVE NEGATIVE    Oxycodone Screen, Ur NEGATIVE NEGATIVE    Test Information       These drug screen results are for medical purposes only and should not be considered definitive or confirmed.       A: 28 y.o. female  at 39w3d weeks  PPD #1 S/P ; Episiotomy was not needed due to a spontaneous 2nd degree (subcutaneous tissue involved) vaginal/perineal laceration and a right urethral laceration  Breast-feeding  GBS colonized status post adequate penicillin G  Hypothyroidism on Synthroid  History of prior macrosomic infant with shoulder dystocia -unremarkable delivery yesterday  Obesity, pregravid BMI 34.56)  Patient Active Problem List   Diagnosis    Hypothyroidism    Adult BMI 34.0-34.9 kg/sq m (pregravid BMI 34.56)    39 3/7 weeks gestation of pregnancy    Encounter for induction of labor    Hypothyroidism in pregnancy, third trimester    Obesity affecting pregnancy in third trimester    GBS (group B Streptococcus carrier), +RV culture, currently pregnant    History of delivery of macrosomal infant (9#) (US at 35w3d 48.6%)    History of shoulder dystocia in prior pregnancy, currently pregnant in third trimester (<20 sec resolved w suprapubic pressure and Manuel)     (normal spontaneous vaginal delivery)    Term birth of  male    Second degree perineal laceration, delivered, current hospitalization    Periurethral laceration (right), delivered, current hospitalization       P: Routine PP care.   Resume prenatal vitamins with iron and Synthroid daily.  Continue ibuprofen and Tylenol as needed for pain.  Home tomorrow is anticipated.    Mike Marshall MD

## 2024-07-12 NOTE — DISCHARGE INSTRUCTIONS
Follow up with your OB doctor in 2 week for a  delivery or in 6 weeks for a vaginal delivery unless otherwise instructed, call the office for appointment..  For breastfeeding support, you can contact our lactation specialists at [phone] or   [phone].                                                                  DIET  Eat a well balanced diet focusing on foods high in fiber and protein  Drink plenty of fluids especially water.  To avoid constipation you may take a mild stool softener as recommended by your doctor or midwife.    ACTIVITY  Gradually increase your activity.  Resume exercise regimen only after advised by your doctor or midwife.  Avoid lifting anything heavier than your baby or a gallon of milk until OK'd by your physician or midlife.   Avoid driving until your doctor or midwife has given their approval.  Rise slowly from a lying to sitting and then a standing position.  Climb stairs one at a time.  Use caution when carrying your baby up and down the stairs.  No sexual activity for 6 weeks or until advised by your doctor - Nothing in vagina: intercourse, tampons, or douching.   Be prepared to discuss family planning at your follow-up OB visit.   You may feel tired or have a lack of energy.  You may continue your prenatal vitamin to replenish nutrients post delivery.  Nap when infant naps to catch up on sleep.  May return to work or school in 6 weeks or as directed by physician or midlife.   Take pain medication as prescribed whenever you need them  Take care of yourself by sleeping/resting as much as possible.  Let someone else care for you, your infant and housework as much as possible for a while.    EMOTIONS  You may feed cristobal, sad, teary, & overwhelmed.    If infant will not stop crying, contact another adult for help or place infant in their crib on their back and take a break.  NEVER shake your infant.    Call your doctor if you have unrelieved feelings of: inability to cope,  it from your diet.  If it does not affect the baby, go ahead and eat it.  Avoid caffeine at bedtime. (chocolate has a lot of caffeine) if the baby is sensitive to it.    For non-breastfeeding moms:  You may apply ice packs to your breasts over your bra for twenty minutes at a time for comfort.  Avoid stimulation to your breasts, when showering allow the water to strike your back not your breasts.    Wear a good fitting bra until your milk dries, such as a sports bra.   If your breasts are very sore, buy a cabbage and wet some of the inner leaves and place in your bra over your breasts.    Your breasts may feel warm when your milk comes in.  This is normal.    INCISIONAL CARE / EVELYNE CARE  Clean your incision in the shower with mild soap.  After shower pat the incision area dry and leave open to air.  If used, Steri-stipes should fall off in shower by 2 weeks.  If used, Staples should be removed by the OB in office by 1 week.  If used/ordered, an abdominal binder may provide support for your incision.   Use the evelyne-bottle after toileting until bleeding stops. It promotes healing and prevents infection.   You are prone to urinary tract infections after a delivery ( c section or vaginal delivery).  Drink a lot of fluids. Take a shower instead of a tub bath until bleeding stops.  Cleanse your perineum from front to back  If used, stitches or internal clips will dissolve in 4-6 weeks.  You may use a sitz bath or soak in a clean tub as needed for comfort.  Kegel exercises will help restore bladder control.   You may use cool compress on incision site.    SWELLING   It takes about 2 weeks to get rid of all of the extra fluid you have from having a baby.  Your feet and hands may swell up more than they are now. Keep your legs elevated when sitting or lying.  When wearing stocking or socks, make sure they are not too tight.    WHEN TO CALL THE DOCTOR  If you have a temp of 100.4 or more.   Your abdomen is tender to

## 2024-07-12 NOTE — H&P
history.    Allergies:  Tuberculin, ppd    Social History:    Social History     Socioeconomic History    Marital status:      Spouse name: Not on file    Number of children: Not on file    Years of education: Not on file    Highest education level: Not on file   Occupational History    Not on file   Tobacco Use    Smoking status: Never    Smokeless tobacco: Never   Substance and Sexual Activity    Alcohol use: Not Currently    Drug use: Never    Sexual activity: Not on file   Other Topics Concern    Not on file   Social History Narrative    Not on file     Social Determinants of Health     Financial Resource Strain: Low Risk  (5/16/2024)    Overall Financial Resource Strain (CARDIA)     Difficulty of Paying Living Expenses: Not hard at all   Food Insecurity: No Food Insecurity (7/10/2024)    Hunger Vital Sign     Worried About Running Out of Food in the Last Year: Never true     Ran Out of Food in the Last Year: Never true   Transportation Needs: No Transportation Needs (7/10/2024)    PRAPARE - Transportation     Lack of Transportation (Medical): No     Lack of Transportation (Non-Medical): No   Physical Activity: Sufficiently Active (5/19/2022)    Exercise Vital Sign     Days of Exercise per Week: 5 days     Minutes of Exercise per Session: 30 min   Stress: Not on file   Social Connections: Not on file   Intimate Partner Violence: Not At Risk (5/19/2022)    Humiliation, Afraid, Rape, and Kick questionnaire     Fear of Current or Ex-Partner: No     Emotionally Abused: No     Physically Abused: No     Sexually Abused: No   Housing Stability: Low Risk  (7/10/2024)    Housing Stability Vital Sign     Unable to Pay for Housing in the Last Year: No     Number of Places Lived in the Last Year: 0     Unstable Housing in the Last Year: No       Family History:       Problem Relation Age of Onset    Obesity Mother     Other Mother         GERD    Diabetes Mother     Depression Father     No Known Problems Brother         Medications Prior to Admission:  Medications Prior to Admission: levothyroxine (SYNTHROID) 150 MCG tablet, Take 1 tablet by mouth daily  Docosahexaenoic Acid (PRENATAL DHA PO),   Cholecalciferol (VITAMIN D3) 50 MCG (2000 UT) CAPS, Take by mouth    REVIEW OF SYSTEMS:  CONSTITUTIONAL:  negative  RESPIRATORY:  negative  CARDIOVASCULAR:  negative  GASTROINTESTINAL:  negative  ALLERGIC/IMMUNOLOGIC:  negative  NEUROLOGICAL:  negative  BEHAVIOR/PSYCH:  negative    PHYSICAL EXAM:  Vitals:    07/11/24 0648 07/11/24 1717 07/11/24 2257 07/12/24 0637   BP: (!) 112/53 (!) 113/58 (!) 116/53 (!) 95/52   Pulse: 78 71 69 62   Resp: 16 16 16 16   Temp: 98.4 °F (36.9 °C) 97.7 °F (36.5 °C) 98.2 °F (36.8 °C) 97.7 °F (36.5 °C)   TempSrc: Temporal Temporal Temporal Temporal   SpO2: 97% 99% 100% 98%     General appearance:  awake, alert, cooperative, no apparent distress, and appears stated age  Neurologic:  Awake, alert, oriented to name, place and time.  Skin: warm, dry, normal color, no rashes  Head:  NC,AT,NT  Eyes:  Sclerae white, pupils equal and reactive, red reflex normal bilaterally  Ears:  Well-positioned, well-formed pinnae; Hearing: intact  Nose:  Clear, normal mucosa  Throat:  Lips, tongue and mucosa are pink, moist and intact; no exudate  Neck:  Supple, symmetrical  Heart:  Regular rate & rhythm, S1 S2, no murmurs, rubs, or gallops  Lungs:  No increased work of breathing, good air exchange, CTA b/l.  Abdomen:  Soft, non tender, gravid, consistent with her gestational age, EFW by Leopald's manouever was 8#  Extremities: No clubbing, cyanosis, cords; No calf tenderness; Edema: no  Pulses:  Strong equal distal pulses, brisk capillary refill  Fetal heart rate:  Reassuring.  Pelvis:  Adequate pelvis  Cervix: 2-3 cm / 70% / soft / -2 / mid, Jackson Score: 7-8  Contraction frequency: Occasional  Membranes:  Intact    Labs:  Recent Results (from the past 72 hour(s))   CBC    Collection Time: 07/10/24  9:05 AM   Result Value Ref

## 2024-07-12 NOTE — PLAN OF CARE
Problem: Postpartum  Goal: Experiences normal postpartum course  Description:  Postpartum OB-Pregnancy care plan goal which identifies if the mother is experiencing a normal postpartum course  2024 by Valentina Davis RN  Outcome: Progressing  2024 by Lina Gallardo RN  Outcome: Progressing  Goal: Appropriate maternal -  bonding  Description:  Postpartum OB-Pregnancy care plan goal which identifies if the mother and  are bonding appropriately  2024 by Valentina Davis RN  Outcome: Progressing  2024 by Lina Gallardo RN  Outcome: Progressing  Goal: Establishment of infant feeding pattern  Description:  Postpartum OB-Pregnancy care plan goal which identifies if the mother is establishing a feeding pattern with their   2024 by Valentina Davis RN  Outcome: Progressing  2024 by Lina Gallardo RN  Outcome: Progressing  Goal: Incisions, wounds, or drain sites healing without S/S of infection  2024 by Valentina Davis RN  Outcome: Progressing  2024 by Lina Gallardo RN  Outcome: Progressing     Problem: Pain  Goal: Verbalizes/displays adequate comfort level or baseline comfort level  2024 by Valentina Davis RN  Outcome: Progressing  2024 by Lina Gallardo RN  Outcome: Progressing     Problem: Infection - Adult  Goal: Absence of infection at discharge  2024 by Valentina Davis RN  Outcome: Progressing  2024 by Lina Gallardo RN  Outcome: Progressing  Goal: Absence of infection during hospitalization  2024 by Valentina Davis RN  Outcome: Progressing  2024 by Lina Gallardo RN  Outcome: Progressing  Goal: Absence of fever/infection during anticipated neutropenic period  2024 by Valentina Davis RN  Outcome: Progressing  2024 by Lina Gallardo RN  Outcome: Progressing     Problem: Safety - Adult  Goal: Free from

## 2024-07-12 NOTE — DISCHARGE INSTR - ACTIVITY
After Your Delivery Discharge Instructions    What to do after you leave the hospital:    Recommended diet: regular diet  Recommended activity: No driving for 1 weeks, no sex or tampon use for 6 weeks. No douching.  No heavy lifting (greater than baby and carrier) for 6 weeks.  Initially, avoid frequent ambulation with stairs - start slowly then increase as tolerated.  You may return to work in 6 weeks.  Showers are fine - avoid bath tubs, hot tubs, swimming.  Follow-up in 6 weeks for final postpartum visit.    Call the Physician with any of these signs and symptoms:    Warning signs regarding stitches:  \"Popping\" of stitches  Foul smelling discharge or pus  More redness or streaks around stitches than before    Perineum / episiotomy care:  Continue care as in the hospital (sitz baths, squirt bottle, etc.)  No tub baths until OK'd by your Physician , showers are fine     After your delivery - signs and symptoms to watch for:  Fever - Oral temperature greater than 100.4 degrees Fahrenheit  Foul-smelling vaginal discharge  Headache unrelieved by \"pain meds\"  Difficulty urinating  Breasts reddened, hard, hot to the touch  Nipple discharge which is foul-smelling or contains pus  Increased pain at the site of the laceration repair  Difficulty breathing with or without chest pain  New calf pain especially if only on one side  Sudden, continuing increased vaginal bleeding (heavier than a period) with or without clots  Unrelieved feelings of:  Inability to cope  Sadness  Anxiety  Lack of interest in baby  Insomnia  Crying     What to do at home:  Resume activity gradually   Don't lift anything heavier than baby and carrier until OK'd by your Physician   No sex until OK'd by your Physician  Eat regular nutritious meals  Take pain medication as prescribed whenever you need them  To avoid/relieve constipation take stool softeners if advised   Increase fiber in your diet    Most importantly ENJOY your Baby!  - Dr. HOFFMAN  =)))    Please call immediately with Questions and Concerns - [phone] (Office) or [phone] (Answering Service) after hours and weekends.     By signing below, I understand that if any emergent problems occur, once I leave the hospital, I am to dial #[phone] or go immediately to the nearest Emergency Room.  For less emergent matters,  Dr. Marshall can be reached by calling his Office or  answering service at the above numbers.  I understand and acknowledge receipt of the instructions indicated above.

## 2024-07-12 NOTE — PROGRESS NOTES
PPD #2    Patient:  Fadumo Wheeler     Admit Date:  7/10/2024  6:14 AM  Medical Record Number:  [id number]   Today's Date: 7/12/2024    S: No complaints, doing well, ready to go home; tolerating diet: yes -general; ambulating well: yes -up in room and halls; voiding without difficulty:  yes -has no urinary complaints; bm: yes - normal; flatus: yes -normal; pain meds appropriate: yes -ibuprofen and Tylenol; vaginal bleeding: Less than a period.    O:   Vitals:    07/11/24 0648 07/11/24 1717 07/11/24 2257 07/12/24 0637   BP: (!) 112/53 (!) 113/58 (!) 116/53 (!) 95/52   Pulse: 78 71 69 62   Resp: 16 16 16 16   Temp: 98.4 °F (36.9 °C) 97.7 °F (36.5 °C) 98.2 °F (36.8 °C) 97.7 °F (36.5 °C)   TempSrc: Temporal Temporal Temporal Temporal   SpO2: 97% 99% 100% 98%     Gen: A&O, cooperative, pleasant   Heart: RRR   Lungs: CTA b/l   Abd; soft, NT, non distended, +BS  Back: no CVA or paraspinal tenderness   Ext: No clubbing, cyanosis, edema: trace - 1+ , no cords palpable, no calf tenderness   Neuro: intact   Uterus: firm, well contracted, nt   Perineum: intact, healing well   Anu pad: staining only, thin lochia    Lab Results   Component Value Date    HGB 12.0 07/10/2024    HCT 36.9 07/10/2024      Recent Results (from the past 72 hour(s))   CBC    Collection Time: 07/10/24  9:05 AM   Result Value Ref Range    WBC 10.5 4.5 - 11.5 k/uL    RBC 4.22 3.50 - 5.50 m/uL    Hemoglobin 12.0 11.5 - 15.5 g/dL    Hematocrit 36.9 34.0 - 48.0 %    MCV 87.4 80.0 - 99.9 fL    MCH 28.4 26.0 - 35.0 pg    MCHC 32.5 32.0 - 34.5 g/dL    RDW 14.4 11.5 - 15.0 %    Platelets 190 130 - 450 k/uL    MPV 10.9 7.0 - 12.0 fL   TYPE AND SCREEN    Collection Time: 07/10/24  9:05 AM   Result Value Ref Range    Blood Bank Sample Expiration 07/13/2024,9613     Arm Band Number [id number]     ABO/Rh O POSITIVE     Antibody Screen NEGATIVE    Drug Screen Multi Urine With Bup    Collection Time: 07/10/24  9:13 AM   Result Value Ref Range    Amphetamine Screen, Ur

## 2024-07-12 NOTE — PROGRESS NOTES
Name: Fadumo Wheeler                [employer]: [employer]   Referral: Baby [place]      Assessment:  Parent(s) were receptive to  visit and baby blessing.        Intervention:   provided blessing for new born and parent(s) and distributed prayer card for family. Patient shared...     Outcome:  Parent/S appreciated blessing for child.     Plan:  Chaplains will remain available to offer spiritual and emotional support as needed.       Electronically signed by SHANNAN Cortez, on 7/12/2024 at 12:35 PM.  [facility]  [facility]  [phone]

## 2024-07-12 NOTE — DISCHARGE INSTR - DIET

## 2024-07-12 NOTE — LACTATION NOTE
Mom reports giving formula last night due to fussiness, baby able to latch with a shield this am. Encouraged frequent feeds to establish milk supply. Reviewed benefits and safety of skin to skin. Inst on adequate I/O and importance of keeping track of diapers at home. Instructed on signs of dehydration such as infant refusing to feed, decreased wet diapers and infant becoming listless and notify provider if these occur. Reviewed with mom the importance of notifying the physician if baby looks more jaundiced. Lactation office # given if follow-up needed, as well as support group information. Encouraged to call with any concerns. Support and encouragement given.

## 2024-08-01 ENCOUNTER — APPOINTMENT (OUTPATIENT)
Dept: GENERAL RADIOLOGY | Age: 29
End: 2024-08-01
Payer: COMMERCIAL

## 2024-08-01 ENCOUNTER — APPOINTMENT (OUTPATIENT)
Dept: ULTRASOUND IMAGING | Age: 29
End: 2024-08-01
Payer: COMMERCIAL

## 2024-08-01 ENCOUNTER — APPOINTMENT (OUTPATIENT)
Dept: CT IMAGING | Age: 29
End: 2024-08-01
Payer: COMMERCIAL

## 2024-08-01 ENCOUNTER — HOSPITAL ENCOUNTER (EMERGENCY)
Age: 29
Discharge: HOME | End: 2024-08-02
Attending: STUDENT IN AN ORGANIZED HEALTH CARE EDUCATION/TRAINING PROGRAM
Payer: COMMERCIAL

## 2024-08-01 DIAGNOSIS — R05.9 COUGH, UNSPECIFIED TYPE: ICD-10-CM

## 2024-08-01 DIAGNOSIS — A49.9 UTI (URINARY TRACT INFECTION), BACTERIAL: ICD-10-CM

## 2024-08-01 DIAGNOSIS — R50.9 FEBRILE ILLNESS: ICD-10-CM

## 2024-08-01 DIAGNOSIS — I88.0 MESENTERIC ADENITIS: Primary | ICD-10-CM

## 2024-08-01 DIAGNOSIS — N39.0 UTI (URINARY TRACT INFECTION), BACTERIAL: ICD-10-CM

## 2024-08-01 DIAGNOSIS — R10.9 ABDOMINAL PAIN, UNSPECIFIED ABDOMINAL LOCATION: ICD-10-CM

## 2024-08-01 DIAGNOSIS — R51.9 NONINTRACTABLE EPISODIC HEADACHE, UNSPECIFIED HEADACHE TYPE: ICD-10-CM

## 2024-08-01 LAB
ALBUMIN SERPL-MCNC: 4.1 G/DL (ref 3.5–5.2)
ALP SERPL-CCNC: 115 U/L (ref 35–104)
ALT SERPL-CCNC: 27 U/L (ref 0–32)
ANION GAP SERPL CALCULATED.3IONS-SCNC: 13 MMOL/L (ref 7–16)
AST SERPL-CCNC: 22 U/L (ref 0–31)
B-HCG SERPL EIA 3RD IS-ACNC: 0.3 MIU/ML (ref 0–7)
BACTERIA URNS QL MICRO: ABNORMAL
BASOPHILS # BLD: 0.02 K/UL (ref 0–0.2)
BASOPHILS NFR BLD: 0 % (ref 0–2)
BILIRUB SERPL-MCNC: 0.6 MG/DL (ref 0–1.2)
BILIRUB UR QL STRIP: NEGATIVE
BUN SERPL-MCNC: 18 MG/DL (ref 6–20)
CALCIUM SERPL-MCNC: 9 MG/DL (ref 8.6–10.2)
CHLORIDE SERPL-SCNC: 99 MMOL/L (ref 98–107)
CLARITY UR: CLEAR
CO2 SERPL-SCNC: 23 MMOL/L (ref 22–29)
COLOR UR: YELLOW
CREAT SERPL-MCNC: 0.7 MG/DL (ref 0.5–1)
EOSINOPHIL # BLD: 0.15 K/UL (ref 0.05–0.5)
EOSINOPHILS RELATIVE PERCENT: 2 % (ref 0–6)
ERYTHROCYTE [DISTWIDTH] IN BLOOD BY AUTOMATED COUNT: 13.2 % (ref 11.5–15)
GFR, ESTIMATED: >90 ML/MIN/1.73M2
GLUCOSE SERPL-MCNC: 96 MG/DL (ref 74–99)
GLUCOSE UR STRIP-MCNC: NEGATIVE MG/DL
HCG, URINE, POC: NEGATIVE
HCT VFR BLD AUTO: 43.5 % (ref 34–48)
HGB BLD-MCNC: 14 G/DL (ref 11.5–15.5)
HGB UR QL STRIP.AUTO: ABNORMAL
IMM GRANULOCYTES # BLD AUTO: 0.04 K/UL (ref 0–0.58)
IMM GRANULOCYTES NFR BLD: 1 % (ref 0–5)
KETONES UR STRIP-MCNC: NEGATIVE MG/DL
LACTATE BLDV-SCNC: 1.6 MMOL/L (ref 0.5–1.9)
LEUKOCYTE ESTERASE UR QL STRIP: ABNORMAL
LIPASE SERPL-CCNC: 25 U/L (ref 13–60)
LYMPHOCYTES NFR BLD: 0.68 K/UL (ref 1.5–4)
LYMPHOCYTES RELATIVE PERCENT: 10 % (ref 20–42)
Lab: NORMAL
MAGNESIUM SERPL-MCNC: 1.6 MG/DL (ref 1.6–2.6)
MCH RBC QN AUTO: 28.2 PG (ref 26–35)
MCHC RBC AUTO-ENTMCNC: 32.2 G/DL (ref 32–34.5)
MCV RBC AUTO: 87.7 FL (ref 80–99.9)
MONOCYTES NFR BLD: 0.7 K/UL (ref 0.1–0.95)
MONOCYTES NFR BLD: 10 % (ref 2–12)
NEGATIVE QC PASS/FAIL: NORMAL
NEUTROPHILS NFR BLD: 78 % (ref 43–80)
NEUTS SEG NFR BLD: 5.46 K/UL (ref 1.8–7.3)
NITRITE UR QL STRIP: NEGATIVE
PH UR STRIP: 5.5 [PH] (ref 5–9)
PLATELET # BLD AUTO: 265 K/UL (ref 130–450)
PMV BLD AUTO: 9.7 FL (ref 7–12)
POSITIVE QC PASS/FAIL: NORMAL
POTASSIUM SERPL-SCNC: 3.9 MMOL/L (ref 3.5–5)
PROT SERPL-MCNC: 7.7 G/DL (ref 6.4–8.3)
PROT UR STRIP-MCNC: NEGATIVE MG/DL
RBC # BLD AUTO: 4.96 M/UL (ref 3.5–5.5)
RBC #/AREA URNS HPF: ABNORMAL /HPF
SARS-COV-2 RDRP RESP QL NAA+PROBE: NOT DETECTED
SODIUM SERPL-SCNC: 135 MMOL/L (ref 132–146)
SP GR UR STRIP: 1.02 (ref 1–1.03)
SPECIMEN DESCRIPTION: NORMAL
UROBILINOGEN UR STRIP-ACNC: 0.2 EU/DL (ref 0–1)
WBC #/AREA URNS HPF: ABNORMAL /HPF
WBC OTHER # BLD: 7.1 K/UL (ref 4.5–11.5)

## 2024-08-01 PROCEDURE — 87635 SARS-COV-2 COVID-19 AMP PRB: CPT

## 2024-08-01 PROCEDURE — 2580000003 HC RX 258: Performed by: STUDENT IN AN ORGANIZED HEALTH CARE EDUCATION/TRAINING PROGRAM

## 2024-08-01 PROCEDURE — 87086 URINE CULTURE/COLONY COUNT: CPT

## 2024-08-01 PROCEDURE — 80053 COMPREHEN METABOLIC PANEL: CPT

## 2024-08-01 PROCEDURE — 84702 CHORIONIC GONADOTROPIN TEST: CPT

## 2024-08-01 PROCEDURE — 74177 CT ABD & PELVIS W/CONTRAST: CPT

## 2024-08-01 PROCEDURE — 76856 US EXAM PELVIC COMPLETE: CPT

## 2024-08-01 PROCEDURE — 6360000004 HC RX CONTRAST MEDICATION: Performed by: RADIOLOGY

## 2024-08-01 PROCEDURE — 85025 COMPLETE CBC W/AUTO DIFF WBC: CPT

## 2024-08-01 PROCEDURE — 87040 BLOOD CULTURE FOR BACTERIA: CPT

## 2024-08-01 PROCEDURE — 96365 THER/PROPH/DIAG IV INF INIT: CPT

## 2024-08-01 PROCEDURE — 96375 TX/PRO/DX INJ NEW DRUG ADDON: CPT

## 2024-08-01 PROCEDURE — 6370000000 HC RX 637 (ALT 250 FOR IP): Performed by: STUDENT IN AN ORGANIZED HEALTH CARE EDUCATION/TRAINING PROGRAM

## 2024-08-01 PROCEDURE — 84145 PROCALCITONIN (PCT): CPT

## 2024-08-01 PROCEDURE — 71046 X-RAY EXAM CHEST 2 VIEWS: CPT

## 2024-08-01 PROCEDURE — 81001 URINALYSIS AUTO W/SCOPE: CPT

## 2024-08-01 PROCEDURE — 99285 EMERGENCY DEPT VISIT HI MDM: CPT

## 2024-08-01 PROCEDURE — 83690 ASSAY OF LIPASE: CPT

## 2024-08-01 PROCEDURE — 70450 CT HEAD/BRAIN W/O DYE: CPT

## 2024-08-01 PROCEDURE — 83605 ASSAY OF LACTIC ACID: CPT

## 2024-08-01 PROCEDURE — 83735 ASSAY OF MAGNESIUM: CPT

## 2024-08-01 PROCEDURE — 6360000002 HC RX W HCPCS: Performed by: STUDENT IN AN ORGANIZED HEALTH CARE EDUCATION/TRAINING PROGRAM

## 2024-08-01 PROCEDURE — 96361 HYDRATE IV INFUSION ADD-ON: CPT

## 2024-08-01 RX ORDER — PROCHLORPERAZINE EDISYLATE 5 MG/ML
10 INJECTION INTRAMUSCULAR; INTRAVENOUS ONCE
Status: COMPLETED | OUTPATIENT
Start: 2024-08-01 | End: 2024-08-01

## 2024-08-01 RX ORDER — DEXAMETHASONE SODIUM PHOSPHATE 10 MG/ML
10 INJECTION INTRAMUSCULAR; INTRAVENOUS ONCE
Status: COMPLETED | OUTPATIENT
Start: 2024-08-01 | End: 2024-08-01

## 2024-08-01 RX ORDER — DIPHENHYDRAMINE HYDROCHLORIDE 50 MG/ML
25 INJECTION INTRAMUSCULAR; INTRAVENOUS ONCE
Status: COMPLETED | OUTPATIENT
Start: 2024-08-01 | End: 2024-08-01

## 2024-08-01 RX ORDER — ONDANSETRON 2 MG/ML
4 INJECTION INTRAMUSCULAR; INTRAVENOUS ONCE
Status: COMPLETED | OUTPATIENT
Start: 2024-08-01 | End: 2024-08-01

## 2024-08-01 RX ORDER — MORPHINE SULFATE 4 MG/ML
4 INJECTION, SOLUTION INTRAMUSCULAR; INTRAVENOUS ONCE
Status: DISCONTINUED | OUTPATIENT
Start: 2024-08-01 | End: 2024-08-02

## 2024-08-01 RX ORDER — 0.9 % SODIUM CHLORIDE 0.9 %
30 INTRAVENOUS SOLUTION INTRAVENOUS ONCE
Status: COMPLETED | OUTPATIENT
Start: 2024-08-01 | End: 2024-08-01

## 2024-08-01 RX ORDER — ACETAMINOPHEN 325 MG/1
650 TABLET ORAL ONCE
Status: COMPLETED | OUTPATIENT
Start: 2024-08-01 | End: 2024-08-01

## 2024-08-01 RX ORDER — MAGNESIUM SULFATE 1 G/100ML
1000 INJECTION INTRAVENOUS ONCE
Status: COMPLETED | OUTPATIENT
Start: 2024-08-01 | End: 2024-08-02

## 2024-08-01 RX ADMIN — MAGNESIUM SULFATE HEPTAHYDRATE 1000 MG: 1 INJECTION, SOLUTION INTRAVENOUS at 23:53

## 2024-08-01 RX ADMIN — PROCHLORPERAZINE EDISYLATE 10 MG: 5 INJECTION INTRAMUSCULAR; INTRAVENOUS at 23:52

## 2024-08-01 RX ADMIN — ACETAMINOPHEN 650 MG: 325 TABLET ORAL at 22:08

## 2024-08-01 RX ADMIN — SODIUM CHLORIDE 2898 ML: 9 INJECTION, SOLUTION INTRAVENOUS at 22:09

## 2024-08-01 RX ADMIN — DIPHENHYDRAMINE HYDROCHLORIDE 25 MG: 50 INJECTION INTRAMUSCULAR; INTRAVENOUS at 23:52

## 2024-08-01 RX ADMIN — IOPAMIDOL 75 ML: 755 INJECTION, SOLUTION INTRAVENOUS at 23:13

## 2024-08-01 RX ADMIN — ONDANSETRON 4 MG: 2 INJECTION INTRAMUSCULAR; INTRAVENOUS at 23:52

## 2024-08-01 RX ADMIN — DEXAMETHASONE SODIUM PHOSPHATE 10 MG: 10 INJECTION INTRAMUSCULAR; INTRAVENOUS at 23:52

## 2024-08-01 ASSESSMENT — PAIN - FUNCTIONAL ASSESSMENT: PAIN_FUNCTIONAL_ASSESSMENT: 0-10

## 2024-08-01 ASSESSMENT — PAIN DESCRIPTION - FREQUENCY: FREQUENCY: INTERMITTENT

## 2024-08-01 ASSESSMENT — PAIN DESCRIPTION - LOCATION: LOCATION: ABDOMEN

## 2024-08-01 ASSESSMENT — PAIN DESCRIPTION - ORIENTATION: ORIENTATION: LOWER;LEFT;RIGHT

## 2024-08-01 ASSESSMENT — ENCOUNTER SYMPTOMS
NAUSEA: 1
VOMITING: 0
ABDOMINAL PAIN: 1
COLOR CHANGE: 0
BLOOD IN STOOL: 0
COUGH: 1
SHORTNESS OF BREATH: 0
DIARRHEA: 0
CONSTIPATION: 0

## 2024-08-01 ASSESSMENT — PAIN DESCRIPTION - PAIN TYPE: TYPE: ACUTE PAIN

## 2024-08-01 ASSESSMENT — PAIN DESCRIPTION - DESCRIPTORS: DESCRIPTORS: SHARP;STABBING

## 2024-08-01 ASSESSMENT — PAIN SCALES - GENERAL: PAINLEVEL_OUTOF10: 10

## 2024-08-01 NOTE — ED PROVIDER NOTES
Department of Emergency Medicine  FIRST PROVIDER TRIAGE NOTE             Independent MLP           8/1/24  6:17 PM EDT    Date of Encounter: 8/1/24   MRN: [id number]      HPI: Fadumo Wheeler is a 28 y.o. female who presents to the ED for Abdominal Pain (Lower abd pain radiating into pelvis since 11am, 3 weeks post partum. ), Chills, and Dizziness       Patient reports that she had sudden onset of sharp pain in her bilateral lower abdomen that started at 11 AM she complains of chills and complains of nausea with no vomiting and states she is 3 weeks postpartum had a vaginal delivery G2, P2 and states her pain radiates to her lower back and she complains of dizziness, fever and chills she is breast-feeding she still has some bleeding from the pregnancy denies any dysuria, frequency or cloudy urine or history of kidney stones.  She did take Tylenol 1000 mg at noon.      Vitals:    08/01/24 1813   BP: 122/70   Pulse: (!) 120   Resp: 18   Temp: (!) 100.8 °F (38.2 °C)   SpO2: 100%         ROS: Negative for cp, sob, cough, vomiting, diarrhea, rash, headache, Suicidal ideation, or Homicidal Ideation.    PE: Gen Appearance/Constitutional: alert  HEENT: NC/NT. PERRLA,  Airway patent.  Neck: supple  CV: tachycardia  Pulm: CTA bilat  GI: tender to palpation in lower abdomen  Musculoskeletal: moves all extremities x 4  Lymphatics: no edema     Initial Plan of Care:  Plan to order/Initiate the following while awaiting opening in ED: urinalysis.   Instructed patient and/or family member with patient if any worsening condition to notify staff.    Provider-Patient relationship only established for Provider In Triage (PIT) secondary to high volume, low staffing, and/or boarding- patient to await bed availability..  Full assessment, HPI and examination not performed.  Discussed with patient that the provider in triage evaluation is not a comprehensive medical screening exam and this is not yet possible at the end of the provider in  triage encounter, to state whether or not an emergency medical condition exist  This ends my PIT-Patient relationship.    Electronically signed by KENDELL Gutierrez CNP   DD: 8/1/24       Laura Berrios APRN - CNP  08/02/24 0123       Laura Berrios APRN - CNP  08/02/24 0127

## 2024-08-02 VITALS
OXYGEN SATURATION: 100 % | HEART RATE: 90 BPM | RESPIRATION RATE: 16 BRPM | DIASTOLIC BLOOD PRESSURE: 53 MMHG | WEIGHT: 213 LBS | BODY MASS INDEX: 34.38 KG/M2 | TEMPERATURE: 98.3 F | SYSTOLIC BLOOD PRESSURE: 104 MMHG

## 2024-08-02 LAB — PROCALCITONIN SERPL-MCNC: 0.03 NG/ML (ref 0–0.08)

## 2024-08-02 PROCEDURE — 6370000000 HC RX 637 (ALT 250 FOR IP): Performed by: STUDENT IN AN ORGANIZED HEALTH CARE EDUCATION/TRAINING PROGRAM

## 2024-08-02 RX ORDER — CEFDINIR 300 MG/1
300 CAPSULE ORAL ONCE
Status: COMPLETED | OUTPATIENT
Start: 2024-08-02 | End: 2024-08-02

## 2024-08-02 RX ORDER — CEFDINIR 300 MG/1
300 CAPSULE ORAL 2 TIMES DAILY
Qty: 20 CAPSULE | Refills: 0 | Status: SHIPPED | OUTPATIENT
Start: 2024-08-02 | End: 2024-08-12

## 2024-08-02 RX ORDER — DICYCLOMINE HCL 20 MG
20 TABLET ORAL EVERY 6 HOURS PRN
Qty: 20 TABLET | Refills: 0 | Status: SHIPPED | OUTPATIENT
Start: 2024-08-02

## 2024-08-02 RX ORDER — IBUPROFEN 600 MG/1
600 TABLET ORAL EVERY 6 HOURS PRN
Qty: 20 TABLET | Refills: 0 | Status: SHIPPED | OUTPATIENT
Start: 2024-08-02

## 2024-08-02 RX ORDER — ONDANSETRON 4 MG/1
4 TABLET, ORALLY DISINTEGRATING ORAL EVERY 8 HOURS PRN
Qty: 15 TABLET | Refills: 0 | Status: SHIPPED | OUTPATIENT
Start: 2024-08-02

## 2024-08-02 RX ADMIN — CEFDINIR 300 MG: 300 CAPSULE ORAL at 01:33

## 2024-08-02 NOTE — ED PROVIDER NOTES
by me, the above displayed indicated labs are abnormal. All other labs obtained during this visit were within normal range or not returned as of this dictation.    RADIOLOGY:   Unless a wet read is documented in MDM or ED course, non-plain film images such as CT, Ultrasound and MRI are read by the radiologist. Plain radiographic images are visualized and preliminarily interpreted by the ED Provider with the findings documented in the ED course or MDM.    Interpretation per the Radiologist below, if available at the time of this note:    CT ABDOMEN PELVIS W IV CONTRAST Additional Contrast? None   Final Result   1. Small lymph nodes of the cecal mesentery suggesting nonspecific mesenteric   adenitis.   2. Desiccated stool distension of the cecum and proximal colon indicating   constipation/fecal stasis.   3. Normal appendix right lower quadrant.   4. Small, fatty periumbilical hernia.         CT HEAD WO CONTRAST   Final Result   No acute intracranial abnormality.         XR CHEST (2 VW)   Final Result   No acute cardiopulmonary process.         US PELVIS COMPLETE   Final Result   Unremarkable pelvic ultrasound.           No results found.    No results found.    PROCEDURES   Unless otherwise noted below, none      PAST MEDICAL HISTORY/Chronic Conditions Affecting Care      has a past medical history of Hypothyroidism.     EMERGENCY DEPARTMENT COURSE    Vitals:    Vitals:    08/01/24 1813 08/01/24 2338 08/02/24 0032   BP: 122/70 (!) 104/53    Pulse: (!) 120 (!) 101 90   Resp: 18 16    Temp: (!) 100.8 °F (38.2 °C) 98.3 °F (36.8 °C)    TempSrc: Oral     SpO2: 100%     Weight: 96.6 kg (213 lb)         Patient was given the following medications:  Medications   ondansetron (ZOFRAN) injection 4 mg (4 mg IntraVENous Given 8/1/24 2352)   sodium chloride 0.9 % bolus 2,898 mL (0 mLs IntraVENous Stopped 8/1/24 2337)   acetaminophen (TYLENOL) tablet 650 mg (650 mg Oral Given 8/1/24 2208)   iopamidol (ISOVUE-370) 76 % injection 75  reviewed, her labs show suggestion of urinary tract infection so she was treated with p.o. Omnicef.  CT on pelvis with IV contrast shows findings suggestive of mesenteric adenitis.  Appendix is normal.  Otherwise no significant abnormalities.  Pelvic ultrasound is unremarkable.  CT head shows no acute intracranial abnormality.  CXR shows no acute process.    At this time, patient is resting comfortably and after the above treatments she is feeling much better.  She will be treated with a course of Omnicef.  She would like to be discharged home and I do feel this is appropriate.  She was advised to pump and dispose of her breastmilk for the next 24 hours.  She was advised to follow-up outpatient with her PCP.  Results and plan for discharge were discussed with the patient and her , they voiced understanding and are amenable.  Strict return precautions were given.        While not exhaustive, the following diagnoses and their severity were considered: Intra-abdominal flexion, dehydration, electrolyte abnormality, viral illness, retained products of conception.      Independent interpretation of Laboratory tests by Yolanda Bravo DO: Please see ED course for documentation of interpreted abnormal and/or relevant lab results.    Independent interpretation of Radiology tests by Yolanda Bravo DO: As above        Non-plain film images such as CT, Ultrasound and MRI are read by the radiologist. Plain radiographic images are visualized and preliminarily interpreted by the ED Provider with the above-noted findings. Interpretation per the Radiologist below, if available at the time of this note are included below.      EKG reviewed and interpreted by me, TIME:  This EKG is signed by Yolanda orta DO.     An EKG was not performed during this encounter.    All labs & imaging were reviewed and interpreted by me, see RESULTS. I have personally reviewed all laboratory and imaging results for this

## 2024-08-02 NOTE — DISCHARGE INSTRUCTIONS
Please return to the ER for any new or worsening symptoms including but not limited to Inability to keep down liquids, difficulty urinating/decreased urination, or Worsening abdominal pain  If prescribed, please be sure to  your prescriptions from the pharmacy  Please follow-up with Primary care provider as instructed  As discussed, please pump your breastmilk and dispose of for the next 24 hours

## 2024-08-04 LAB
MICROORGANISM SPEC CULT: ABNORMAL
MICROORGANISM SPEC CULT: NORMAL
MICROORGANISM SPEC CULT: NORMAL
SERVICE CMNT-IMP: ABNORMAL
SERVICE CMNT-IMP: NORMAL
SERVICE CMNT-IMP: NORMAL
SPECIMEN DESCRIPTION: ABNORMAL
SPECIMEN DESCRIPTION: NORMAL
SPECIMEN DESCRIPTION: NORMAL

## 2024-08-07 LAB
MICROORGANISM SPEC CULT: NORMAL
MICROORGANISM SPEC CULT: NORMAL
SERVICE CMNT-IMP: NORMAL
SERVICE CMNT-IMP: NORMAL
SPECIMEN DESCRIPTION: NORMAL
SPECIMEN DESCRIPTION: NORMAL

## 2024-09-30 NOTE — TELEPHONE ENCOUNTER
Dr. Schwab, patient interested in free prenatal vitamins and iron per response from Maternity Risk Survey.    Order for BS Baby Box pending for your convenience.    Thank you,  Linn Greene, PharmD  Ambulatory Care Clinical Pharmacist- [facility] Clinical Pharmacy  Department, toll free: [phone]  [facility]      =======================================================================    [facility] CLINICAL PHARMACY REVIEW - Be Well With Baby    SUBJECTIVE  Fadumo Wheeler is a 29 y.o. female currently identified as interested in receiving a [facility] \"Baby Box\" containing a 1 year supply of prenatal vitamins from [facility].    Contents of Baby Box:  Welcome Letter  6 month supply of Nature's Blend Prenatal Multivitamin with Minerals (Take 1 softgel once daily) with 1 refill    Thank you  Linn Greene, PharmSTAR  Ambulatory Care Clinical Pharmacist- [facility] Clinical Pharmacy  Department, toll free: [phone]  [facility]

## 2025-06-03 RX ORDER — LEVOTHYROXINE SODIUM 100 UG/1
100 TABLET ORAL DAILY
Qty: 90 TABLET | Refills: 1 | Status: SHIPPED | OUTPATIENT
Start: 2025-06-03

## 2025-06-03 NOTE — TELEPHONE ENCOUNTER
Name of Medication(s) Requested:  Requested Prescriptions      No prescriptions requested or ordered in this encounter   levothyroxine (SYNTHROID) 100 MCG tablet     Medication is on current medication list Yes    Dosage and directions were verified? Yes    Quantity verified: 30 day supply     Pharmacy Verified?  Yes    Last Appointment:  5/16/2024    Future appts:  Future Appointments   Date Time Provider Department Center   6/10/2025  9:15 AM Aidan Wheat, DO [facility] DEP        (If no appt send self scheduling link. .REFILLAPPT)  Scheduling request sent?     [] Yes  [] No    Does patient need updated?  [] Yes  [] No  [facility] [address] [place] [address], [place] - [address] - P [phone] - F [phone] [[id number]]

## 2025-06-10 ENCOUNTER — OFFICE VISIT (OUTPATIENT)
Dept: PRIMARY CARE CLINIC | Age: 30
End: 2025-06-10
Payer: COMMERCIAL

## 2025-06-10 VITALS
HEART RATE: 78 BPM | HEIGHT: 67 IN | WEIGHT: 221 LBS | DIASTOLIC BLOOD PRESSURE: 66 MMHG | OXYGEN SATURATION: 97 % | BODY MASS INDEX: 34.69 KG/M2 | SYSTOLIC BLOOD PRESSURE: 100 MMHG | RESPIRATION RATE: 14 BRPM | TEMPERATURE: 97.1 F

## 2025-06-10 DIAGNOSIS — E03.9 HYPOTHYROIDISM, UNSPECIFIED TYPE: Primary | ICD-10-CM

## 2025-06-10 LAB
ALBUMIN: 4.1 G/DL (ref 3.5–5.2)
ALP BLD-CCNC: 80 U/L (ref 35–104)
ALT SERPL-CCNC: 7 U/L (ref 0–35)
ANION GAP SERPL CALCULATED.3IONS-SCNC: 8 MMOL/L (ref 7–16)
AST SERPL-CCNC: 15 U/L (ref 0–35)
BILIRUB SERPL-MCNC: 0.6 MG/DL (ref 0–1.2)
BUN BLDV-MCNC: 11 MG/DL (ref 6–20)
CALCIUM SERPL-MCNC: 9.1 MG/DL (ref 8.6–10)
CHLORIDE BLD-SCNC: 107 MMOL/L (ref 98–107)
CO2: 26 MMOL/L (ref 22–29)
CREAT SERPL-MCNC: 0.6 MG/DL (ref 0.5–1)
GFR, ESTIMATED: >90 ML/MIN/1.73M2
GLUCOSE BLD-MCNC: 117 MG/DL (ref 74–99)
HCT VFR BLD CALC: 41.4 % (ref 34–48)
HEMOGLOBIN: 13 G/DL (ref 11.5–15.5)
MCH RBC QN AUTO: 28.3 PG (ref 26–35)
MCHC RBC AUTO-ENTMCNC: 31.4 G/DL (ref 32–34.5)
MCV RBC AUTO: 90 FL (ref 80–99.9)
PDW BLD-RTO: 13.5 % (ref 11.5–15)
PLATELET # BLD: 241 K/UL (ref 130–450)
PMV BLD AUTO: 10.7 FL (ref 7–12)
POTASSIUM SERPL-SCNC: 3.9 MMOL/L (ref 3.5–5.1)
RBC # BLD: 4.6 M/UL (ref 3.5–5.5)
SODIUM BLD-SCNC: 141 MMOL/L (ref 136–145)
TOTAL PROTEIN: 6.8 G/DL (ref 6.4–8.3)
TSH SERPL DL<=0.05 MIU/L-ACNC: 10.9 UIU/ML (ref 0.27–4.2)
WBC # BLD: 6.9 K/UL (ref 4.5–11.5)

## 2025-06-10 PROCEDURE — G2211 COMPLEX E/M VISIT ADD ON: HCPCS | Performed by: STUDENT IN AN ORGANIZED HEALTH CARE EDUCATION/TRAINING PROGRAM

## 2025-06-10 PROCEDURE — 99214 OFFICE O/P EST MOD 30 MIN: CPT | Performed by: STUDENT IN AN ORGANIZED HEALTH CARE EDUCATION/TRAINING PROGRAM

## 2025-06-10 PROCEDURE — 36415 COLL VENOUS BLD VENIPUNCTURE: CPT | Performed by: STUDENT IN AN ORGANIZED HEALTH CARE EDUCATION/TRAINING PROGRAM

## 2025-06-10 SDOH — ECONOMIC STABILITY: FOOD INSECURITY: WITHIN THE PAST 12 MONTHS, YOU WORRIED THAT YOUR FOOD WOULD RUN OUT BEFORE YOU GOT MONEY TO BUY MORE.: NEVER TRUE

## 2025-06-10 SDOH — ECONOMIC STABILITY: FOOD INSECURITY: WITHIN THE PAST 12 MONTHS, THE FOOD YOU BOUGHT JUST DIDN'T LAST AND YOU DIDN'T HAVE MONEY TO GET MORE.: NEVER TRUE

## 2025-06-10 ASSESSMENT — PATIENT HEALTH QUESTIONNAIRE - PHQ9
1. LITTLE INTEREST OR PLEASURE IN DOING THINGS: NOT AT ALL
SUM OF ALL RESPONSES TO PHQ QUESTIONS 1-9: 0
2. FEELING DOWN, DEPRESSED OR HOPELESS: NOT AT ALL

## 2025-06-10 NOTE — PROGRESS NOTES
[facility] Primary Care  Department of Family Medicine      Patient:  Fadumo Wheeler 29 y.o. female     Date of Service: 6/10/25      Chief complaint:   Chief Complaint   Patient presents with    Hypothyroidism         History ofPresent Illness   The patient is a 29 y.o. female  presented to the clinic with complaints as above.    Hypothyroidism  -f/u  -tolerating synthroid  -not having any symptoms, feels overall well     Past Medical History:      Diagnosis Date    Hypothyroidism        PastSurgical History:    No past surgical history on file.    Allergies:    Tuberculin, ppd    Social History:   Social History     Socioeconomic History    Marital status:      Spouse name: Not on file    Number of children: Not on file    Years of education: Not on file    Highest education level: Not on file   Occupational History    Not on file   Tobacco Use    Smoking status: Never    Smokeless tobacco: Never   Substance and Sexual Activity    Alcohol use: Not Currently    Drug use: Never    Sexual activity: Not on file   Other Topics Concern    Not on file   Social History Narrative    Not on file     Social Drivers of Health     Financial Resource Strain: Low Risk  (5/16/2024)    Overall Financial Resource Strain (CARDIA)     Difficulty of Paying Living Expenses: Not hard at all   Food Insecurity: No Food Insecurity (6/10/2025)    Hunger Vital Sign     Worried About Running Out of Food in the Last Year: Never true     Ran Out of Food in the Last Year: Never true   Transportation Needs: No Transportation Needs (6/10/2025)    PRAPARE - Transportation     Lack of Transportation (Medical): No     Lack of Transportation (Non-Medical): No   Physical Activity: Sufficiently Active (5/19/2022)    Exercise Vital Sign     Days of Exercise per Week: 5 days     Minutes of Exercise per Session: 30 min   Stress: Not on file   Social Connections: Not on file   Intimate Partner Violence: Not At Risk (5/19/2022)

## 2025-06-11 ENCOUNTER — RESULTS FOLLOW-UP (OUTPATIENT)
Dept: PRIMARY CARE CLINIC | Age: 30
End: 2025-06-11

## 2025-06-11 DIAGNOSIS — E03.9 HYPOTHYROIDISM, UNSPECIFIED TYPE: Primary | ICD-10-CM

## 2025-06-11 RX ORDER — LEVOTHYROXINE SODIUM 112 UG/1
112 TABLET ORAL DAILY
Qty: 30 TABLET | Refills: 3 | Status: SHIPPED | OUTPATIENT
Start: 2025-06-11

## 2025-06-11 RX ORDER — LEVOTHYROXINE SODIUM 112 MCG
112 TABLET ORAL DAILY
Qty: 30 TABLET | Refills: 2 | Status: SHIPPED | OUTPATIENT
Start: 2025-06-11 | End: 2025-06-11